# Patient Record
Sex: FEMALE | Race: WHITE | NOT HISPANIC OR LATINO | Employment: FULL TIME | URBAN - METROPOLITAN AREA
[De-identification: names, ages, dates, MRNs, and addresses within clinical notes are randomized per-mention and may not be internally consistent; named-entity substitution may affect disease eponyms.]

---

## 2017-01-05 ENCOUNTER — OFFICE VISIT (OUTPATIENT)
Dept: FAMILY MEDICINE CLINIC | Facility: CLINIC | Age: 42
End: 2017-01-05

## 2017-01-05 VITALS
TEMPERATURE: 97.8 F | HEIGHT: 69 IN | DIASTOLIC BLOOD PRESSURE: 64 MMHG | SYSTOLIC BLOOD PRESSURE: 108 MMHG | WEIGHT: 134 LBS | OXYGEN SATURATION: 98 % | HEART RATE: 73 BPM | BODY MASS INDEX: 19.85 KG/M2

## 2017-01-05 DIAGNOSIS — J40 BRONCHITIS: Primary | ICD-10-CM

## 2017-01-05 DIAGNOSIS — J44.1 COPD WITH ACUTE EXACERBATION (HCC): ICD-10-CM

## 2017-01-05 PROCEDURE — 99213 OFFICE O/P EST LOW 20 MIN: CPT | Performed by: FAMILY MEDICINE

## 2017-01-05 RX ORDER — CEFUROXIME AXETIL 250 MG/1
250 TABLET ORAL 2 TIMES DAILY
Qty: 20 TABLET | Refills: 0 | Status: SHIPPED | OUTPATIENT
Start: 2017-01-05 | End: 2020-04-11

## 2017-01-05 NOTE — MR AVS SNAPSHOT
Ana Thompson   2017 2:30 PM   Office Visit    Provider:  Jeff Swan MD   Department:  [facility]IC[facility]   Dept Phone:  [phone]                Your Full Care Plan              Today's Medication Changes          These changes are accurate as of: 17  3:10 PM.  If you have any questions, ask your nurse or doctor.               Stop taking medication(s)listed here:     butalbital-acetaminophen-caffeine -40 MG per tablet   Commonly known as:  FIORICET   Stopped by:  Jeff Swan MD                      Your Updated Medication List      Notice  As of 2017  3:10 PM    You have not been prescribed any medications.            You Were Diagnosed With        Codes Comments    Bronchitis    -  Primary ICD-10-CM: J40  ICD-9-CM: 490     COPD with acute exacerbation     ICD-10-CM: J44.1  ICD-9-CM: 491.21       Instructions     None    Patient Instructions History      [employer] Signup     [facility] [employer] allows you to send messages to your doctor, view your test results, renew your prescriptions, schedule appointments, and more. To sign up, go to [employer] and click on the Sign Up Now link in the New User? box. Enter your [employer] Activation Code exactly as it appears below along with the last four digits of your Social Security Number and your Date of Birth () to complete the sign-up process. If you do not sign up before the expiration date, you must request a new code.    Fire[employer] Activation Code: [email or web address]  Expires: 2017  3:10 PM    If you have questions, you can email [email or web address] or call [phone] to talk to our [employer] staff. Remember, [employer] is NOT to be used for urgent needs. For medical emergencies, dial 911.               Other Info from Your Visit           Allergies     Codeine      Iodine      Reglan [Metoclopramide]        Reason for Visit     Cough     Fatigue     Headache         "     Vital Signs     Blood Pressure Pulse Temperature Height Weight Oxygen Saturation    108/64 73 97.8 °F (36.6 °C) 69\" (175.3 cm) 134 lb (60.8 kg) 98%    Body Mass Index Smoking Status                19.79 kg/m2 Current Every Day Smoker          Problems and Diagnoses Noted     Bronchitis    -  Primary    COPD with acute exacerbation          "

## 2017-01-05 NOTE — PROGRESS NOTES
"Subjective   Ana Thompson is a 41 y.o. female.     Cough   This is a new problem. The current episode started more than 1 month ago. The problem has been unchanged. The cough is non-productive. Associated symptoms include headaches, postnasal drip and wheezing. Pertinent negatives include no chills, fever, myalgias, nasal congestion, sore throat or shortness of breath. The symptoms are aggravated by lying down. Risk factors for lung disease include smoking/tobacco exposure (smoker). She has tried OTC cough suppressant for the symptoms. The treatment provided mild relief.        The following portions of the patient's history were reviewed and updated as appropriate: allergies, current medications, past social history and problem list.    Review of Systems   Constitutional: Negative for chills and fever.   HENT: Positive for postnasal drip. Negative for sore throat.    Respiratory: Positive for cough and wheezing. Negative for shortness of breath.    Musculoskeletal: Negative for myalgias.   Neurological: Positive for headaches.       Objective   Visit Vitals   • /64   • Pulse 73   • Temp 97.8 °F (36.6 °C)   • Ht 69\" (175.3 cm)   • Wt 134 lb (60.8 kg)   • SpO2 98%   • BMI 19.79 kg/m2     Physical Exam   Constitutional: She appears well-developed and well-nourished.   HENT:   Right Ear: External ear normal.   Left Ear: External ear normal.   Mouth/Throat: Oropharynx is clear and moist.   Cardiovascular: Normal rate, regular rhythm and normal heart sounds.    Pulmonary/Chest: Effort normal. She has wheezes.   Mild expiratory wheezin with good air exchange.   Nursing note and vitals reviewed.      Assessment/Plan   Problem List Items Addressed This Visit     None      Visit Diagnoses     Bronchitis    -  Primary    COPD with acute exacerbation                  Drink plenty fluids.    Advised to stop smoking.    Sample for Stiolto 2 puffs daily #1+0.    Rx for Cefuroxime 250 mg twice a day for 10 days.    Check a " chest x-ray. Report results by letter.    Work excuse for yesterday and today.    Follow up as needed.                    Scribed for Dr Jeff Swan by Minal Alexandra CMA.    I, Jeff Swan MD, personally performed the services described in this documentation, as scribed by Minal Alexandra in my presence, and is both accurate and complete.

## 2020-04-11 PROCEDURE — 87491 CHLMYD TRACH DNA AMP PROBE: CPT | Performed by: NURSE PRACTITIONER

## 2020-04-11 PROCEDURE — 87801 DETECT AGNT MULT DNA AMPLI: CPT | Performed by: NURSE PRACTITIONER

## 2020-04-11 PROCEDURE — 87798 DETECT AGENT NOS DNA AMP: CPT | Performed by: NURSE PRACTITIONER

## 2020-04-11 PROCEDURE — 87661 TRICHOMONAS VAGINALIS AMPLIF: CPT | Performed by: NURSE PRACTITIONER

## 2020-04-11 PROCEDURE — 87591 N.GONORRHOEAE DNA AMP PROB: CPT | Performed by: NURSE PRACTITIONER

## 2020-04-15 ENCOUNTER — TELEPHONE (OUTPATIENT)
Dept: URGENT CARE | Facility: CLINIC | Age: 45
End: 2020-04-15

## 2020-04-16 ENCOUNTER — TELEPHONE (OUTPATIENT)
Dept: URGENT CARE | Facility: CLINIC | Age: 45
End: 2020-04-16

## 2020-04-16 NOTE — TELEPHONE ENCOUNTER
Advised Pt of results from testing. Pt is starting to feel better and has been taking medication as prescribed. [facility] Also advised to increase water intake.

## 2021-04-28 ENCOUNTER — APPOINTMENT (OUTPATIENT)
Dept: CT IMAGING | Facility: HOSPITAL | Age: 46
End: 2021-04-28

## 2021-04-28 ENCOUNTER — HOSPITAL ENCOUNTER (EMERGENCY)
Facility: HOSPITAL | Age: 46
Discharge: HOME | End: 2021-04-28
Attending: EMERGENCY MEDICINE | Admitting: EMERGENCY MEDICINE

## 2021-04-28 VITALS
HEIGHT: 68 IN | SYSTOLIC BLOOD PRESSURE: 101 MMHG | WEIGHT: 112 LBS | OXYGEN SATURATION: 97 % | DIASTOLIC BLOOD PRESSURE: 47 MMHG | BODY MASS INDEX: 16.97 KG/M2 | RESPIRATION RATE: 18 BRPM | TEMPERATURE: 98 F | HEART RATE: 72 BPM

## 2021-04-28 DIAGNOSIS — R10.30 LOWER ABDOMINAL PAIN: ICD-10-CM

## 2021-04-28 DIAGNOSIS — K50.918 CROHN'S DISEASE WITH OTHER COMPLICATION, UNSPECIFIED GASTROINTESTINAL TRACT LOCATION (HCC): Primary | ICD-10-CM

## 2021-04-28 LAB
ALBUMIN SERPL-MCNC: 3.7 G/DL (ref 3.5–5.2)
ALBUMIN/GLOB SERPL: 1.5 G/DL
ALP SERPL-CCNC: 57 U/L (ref 39–117)
ALT SERPL W P-5'-P-CCNC: 10 U/L (ref 1–33)
ANION GAP SERPL CALCULATED.3IONS-SCNC: 10 MMOL/L (ref 5–15)
AST SERPL-CCNC: 14 U/L (ref 1–32)
B-HCG UR QL: NEGATIVE
BASOPHILS # BLD AUTO: 0.04 10*3/MM3 (ref 0–0.2)
BASOPHILS NFR BLD AUTO: 0.5 % (ref 0–1.5)
BILIRUB SERPL-MCNC: 0.2 MG/DL (ref 0–1.2)
BILIRUB UR QL STRIP: NEGATIVE
BUN SERPL-MCNC: 10 MG/DL (ref 6–20)
BUN/CREAT SERPL: 15.2 (ref 7–25)
CALCIUM SPEC-SCNC: 8.5 MG/DL (ref 8.6–10.5)
CHLORIDE SERPL-SCNC: 102 MMOL/L (ref 98–107)
CLARITY UR: CLEAR
CO2 SERPL-SCNC: 28 MMOL/L (ref 22–29)
COLOR UR: YELLOW
CREAT SERPL-MCNC: 0.66 MG/DL (ref 0.57–1)
DEPRECATED RDW RBC AUTO: 45 FL (ref 37–54)
EOSINOPHIL # BLD AUTO: 0.15 10*3/MM3 (ref 0–0.4)
EOSINOPHIL NFR BLD AUTO: 2 % (ref 0.3–6.2)
ERYTHROCYTE [DISTWIDTH] IN BLOOD BY AUTOMATED COUNT: 12.2 % (ref 12.3–15.4)
GFR SERPL CREATININE-BSD FRML MDRD: 97 ML/MIN/1.73
GLOBULIN UR ELPH-MCNC: 2.4 GM/DL
GLUCOSE SERPL-MCNC: 112 MG/DL (ref 65–99)
GLUCOSE UR STRIP-MCNC: NEGATIVE MG/DL
HCT VFR BLD AUTO: 40.1 % (ref 34–46.6)
HGB BLD-MCNC: 13.3 G/DL (ref 12–15.9)
HGB UR QL STRIP.AUTO: NEGATIVE
HOLD SPECIMEN: NORMAL
IMM GRANULOCYTES # BLD AUTO: 0.02 10*3/MM3 (ref 0–0.05)
IMM GRANULOCYTES NFR BLD AUTO: 0.3 % (ref 0–0.5)
INTERNAL NEGATIVE CONTROL: NEGATIVE
INTERNAL POSITIVE CONTROL: POSITIVE
KETONES UR QL STRIP: NEGATIVE
LEUKOCYTE ESTERASE UR QL STRIP.AUTO: NEGATIVE
LIPASE SERPL-CCNC: 38 U/L (ref 13–60)
LYMPHOCYTES # BLD AUTO: 2.46 10*3/MM3 (ref 0.7–3.1)
LYMPHOCYTES NFR BLD AUTO: 32.1 % (ref 19.6–45.3)
Lab: NORMAL
MCH RBC QN AUTO: 32.7 PG (ref 26.6–33)
MCHC RBC AUTO-ENTMCNC: 33.2 G/DL (ref 31.5–35.7)
MCV RBC AUTO: 98.5 FL (ref 79–97)
MONOCYTES # BLD AUTO: 0.56 10*3/MM3 (ref 0.1–0.9)
MONOCYTES NFR BLD AUTO: 7.3 % (ref 5–12)
NEUTROPHILS NFR BLD AUTO: 4.44 10*3/MM3 (ref 1.7–7)
NEUTROPHILS NFR BLD AUTO: 57.8 % (ref 42.7–76)
NITRITE UR QL STRIP: NEGATIVE
NRBC BLD AUTO-RTO: 0 /100 WBC (ref 0–0.2)
PH UR STRIP.AUTO: <=5 [PH] (ref 5–8)
PLATELET # BLD AUTO: 280 10*3/MM3 (ref 140–450)
PMV BLD AUTO: 11.7 FL (ref 6–12)
POTASSIUM SERPL-SCNC: 3.2 MMOL/L (ref 3.5–5.2)
PROT SERPL-MCNC: 6.1 G/DL (ref 6–8.5)
PROT UR QL STRIP: NEGATIVE
RBC # BLD AUTO: 4.07 10*6/MM3 (ref 3.77–5.28)
SODIUM SERPL-SCNC: 140 MMOL/L (ref 136–145)
SP GR UR STRIP: 1.02 (ref 1–1.03)
UROBILINOGEN UR QL STRIP: NORMAL
WBC # BLD AUTO: 7.67 10*3/MM3 (ref 3.4–10.8)
WHOLE BLOOD HOLD SPECIMEN: NORMAL
WHOLE BLOOD HOLD SPECIMEN: NORMAL

## 2021-04-28 PROCEDURE — 25010000002 DIPHENHYDRAMINE PER 50 MG: Performed by: EMERGENCY MEDICINE

## 2021-04-28 PROCEDURE — 85025 COMPLETE CBC W/AUTO DIFF WBC: CPT | Performed by: EMERGENCY MEDICINE

## 2021-04-28 PROCEDURE — 74177 CT ABD & PELVIS W/CONTRAST: CPT

## 2021-04-28 PROCEDURE — 81003 URINALYSIS AUTO W/O SCOPE: CPT | Performed by: EMERGENCY MEDICINE

## 2021-04-28 PROCEDURE — 99283 EMERGENCY DEPT VISIT LOW MDM: CPT

## 2021-04-28 PROCEDURE — 25010000002 IOPAMIDOL 61 % SOLUTION: Performed by: EMERGENCY MEDICINE

## 2021-04-28 PROCEDURE — 83690 ASSAY OF LIPASE: CPT | Performed by: EMERGENCY MEDICINE

## 2021-04-28 PROCEDURE — 96375 TX/PRO/DX INJ NEW DRUG ADDON: CPT

## 2021-04-28 PROCEDURE — 81025 URINE PREGNANCY TEST: CPT | Performed by: EMERGENCY MEDICINE

## 2021-04-28 PROCEDURE — 96374 THER/PROPH/DIAG INJ IV PUSH: CPT

## 2021-04-28 PROCEDURE — 80053 COMPREHEN METABOLIC PANEL: CPT | Performed by: EMERGENCY MEDICINE

## 2021-04-28 PROCEDURE — 25010000002 METHYLPREDNISOLONE PER 40 MG: Performed by: EMERGENCY MEDICINE

## 2021-04-28 RX ORDER — POTASSIUM CHLORIDE 750 MG/1
20 CAPSULE, EXTENDED RELEASE ORAL ONCE
Status: COMPLETED | OUTPATIENT
Start: 2021-04-28 | End: 2021-04-28

## 2021-04-28 RX ORDER — ONDANSETRON 4 MG/1
4 TABLET, ORALLY DISINTEGRATING ORAL 4 TIMES DAILY PRN
Qty: 15 TABLET | Refills: 0 | Status: SHIPPED | OUTPATIENT
Start: 2021-04-28 | End: 2022-08-24

## 2021-04-28 RX ORDER — PREDNISONE 20 MG/1
60 TABLET ORAL DAILY
Qty: 12 TABLET | Refills: 0 | Status: SHIPPED | OUTPATIENT
Start: 2021-04-28 | End: 2021-05-02

## 2021-04-28 RX ORDER — SODIUM CHLORIDE 9 MG/ML
10 INJECTION INTRAVENOUS AS NEEDED
Status: DISCONTINUED | OUTPATIENT
Start: 2021-04-28 | End: 2021-04-28 | Stop reason: HOSPADM

## 2021-04-28 RX ORDER — METHYLPREDNISOLONE SODIUM SUCCINATE 40 MG/ML
40 INJECTION, POWDER, LYOPHILIZED, FOR SOLUTION INTRAMUSCULAR; INTRAVENOUS EVERY 4 HOURS
Status: DISCONTINUED | OUTPATIENT
Start: 2021-04-28 | End: 2021-04-28 | Stop reason: HOSPADM

## 2021-04-28 RX ORDER — DIPHENHYDRAMINE HYDROCHLORIDE 50 MG/ML
50 INJECTION INTRAMUSCULAR; INTRAVENOUS ONCE
Status: COMPLETED | OUTPATIENT
Start: 2021-04-28 | End: 2021-04-28

## 2021-04-28 RX ORDER — TRAMADOL HYDROCHLORIDE 50 MG/1
50 TABLET ORAL EVERY 6 HOURS PRN
Qty: 15 TABLET | Refills: 0 | Status: SHIPPED | OUTPATIENT
Start: 2021-04-28 | End: 2022-08-24

## 2021-04-28 RX ADMIN — POTASSIUM CHLORIDE 20 MEQ: 750 CAPSULE, EXTENDED RELEASE ORAL at 14:40

## 2021-04-28 RX ADMIN — SODIUM CHLORIDE 1000 ML: 9 INJECTION, SOLUTION INTRAVENOUS at 13:12

## 2021-04-28 RX ADMIN — METHYLPREDNISOLONE SODIUM SUCCINATE 40 MG: 40 INJECTION, POWDER, FOR SOLUTION INTRAMUSCULAR; INTRAVENOUS at 13:13

## 2021-04-28 RX ADMIN — IOPAMIDOL 80 ML: 612 INJECTION, SOLUTION INTRAVENOUS at 14:27

## 2021-04-28 RX ADMIN — DIPHENHYDRAMINE HYDROCHLORIDE 50 MG: 50 INJECTION INTRAMUSCULAR; INTRAVENOUS at 13:13

## 2022-09-29 ENCOUNTER — HOSPITAL ENCOUNTER (EMERGENCY)
Facility: HOSPITAL | Age: 47
Discharge: HOME | End: 2022-09-29
Attending: EMERGENCY MEDICINE | Admitting: EMERGENCY MEDICINE

## 2022-09-29 ENCOUNTER — APPOINTMENT (OUTPATIENT)
Dept: CT IMAGING | Facility: HOSPITAL | Age: 47
End: 2022-09-29

## 2022-09-29 VITALS
HEART RATE: 65 BPM | OXYGEN SATURATION: 97 % | DIASTOLIC BLOOD PRESSURE: 69 MMHG | RESPIRATION RATE: 16 BRPM | BODY MASS INDEX: 19.7 KG/M2 | SYSTOLIC BLOOD PRESSURE: 95 MMHG | WEIGHT: 130 LBS | HEIGHT: 68 IN | TEMPERATURE: 98 F

## 2022-09-29 DIAGNOSIS — R10.84 GENERALIZED ABDOMINAL PAIN: Primary | ICD-10-CM

## 2022-09-29 DIAGNOSIS — K50.10 CROHN'S DISEASE OF COLON WITHOUT COMPLICATION: ICD-10-CM

## 2022-09-29 LAB
ALBUMIN SERPL-MCNC: 4 G/DL (ref 3.5–5.2)
ALBUMIN/GLOB SERPL: 1.5 G/DL
ALP SERPL-CCNC: 62 U/L (ref 39–117)
ALT SERPL W P-5'-P-CCNC: 12 U/L (ref 1–33)
ANION GAP SERPL CALCULATED.3IONS-SCNC: 10 MMOL/L (ref 5–15)
AST SERPL-CCNC: 15 U/L (ref 1–32)
B-HCG UR QL: NEGATIVE
BACTERIA UR QL AUTO: ABNORMAL /HPF
BASOPHILS # BLD AUTO: 0.04 10*3/MM3 (ref 0–0.2)
BASOPHILS NFR BLD AUTO: 0.6 % (ref 0–1.5)
BILIRUB SERPL-MCNC: 0.5 MG/DL (ref 0–1.2)
BILIRUB UR QL STRIP: NEGATIVE
BUN SERPL-MCNC: 14 MG/DL (ref 6–20)
BUN/CREAT SERPL: 21.9 (ref 7–25)
CALCIUM SPEC-SCNC: 8.6 MG/DL (ref 8.6–10.5)
CHLORIDE SERPL-SCNC: 104 MMOL/L (ref 98–107)
CLARITY UR: CLEAR
CO2 SERPL-SCNC: 28 MMOL/L (ref 22–29)
COLOR UR: YELLOW
CREAT SERPL-MCNC: 0.64 MG/DL (ref 0.57–1)
D-LACTATE SERPL-SCNC: 1.1 MMOL/L (ref 0.5–2)
DEPRECATED RDW RBC AUTO: 40.8 FL (ref 37–54)
EGFRCR SERPLBLD CKD-EPI 2021: 109.8 ML/MIN/1.73
EOSINOPHIL # BLD AUTO: 0.14 10*3/MM3 (ref 0–0.4)
EOSINOPHIL NFR BLD AUTO: 2 % (ref 0.3–6.2)
ERYTHROCYTE [DISTWIDTH] IN BLOOD BY AUTOMATED COUNT: 11.9 % (ref 12.3–15.4)
GLOBULIN UR ELPH-MCNC: 2.6 GM/DL
GLUCOSE SERPL-MCNC: 94 MG/DL (ref 65–99)
GLUCOSE UR STRIP-MCNC: NEGATIVE MG/DL
HCT VFR BLD AUTO: 37.3 % (ref 34–46.6)
HGB BLD-MCNC: 12.5 G/DL (ref 12–15.9)
HGB UR QL STRIP.AUTO: NEGATIVE
HOLD SPECIMEN: NORMAL
HYALINE CASTS UR QL AUTO: ABNORMAL /LPF
IMM GRANULOCYTES # BLD AUTO: 0.01 10*3/MM3 (ref 0–0.05)
IMM GRANULOCYTES NFR BLD AUTO: 0.1 % (ref 0–0.5)
KETONES UR QL STRIP: ABNORMAL
LEUKOCYTE ESTERASE UR QL STRIP.AUTO: ABNORMAL
LYMPHOCYTES # BLD AUTO: 1.72 10*3/MM3 (ref 0.7–3.1)
LYMPHOCYTES NFR BLD AUTO: 24.4 % (ref 19.6–45.3)
MCH RBC QN AUTO: 32 PG (ref 26.6–33)
MCHC RBC AUTO-ENTMCNC: 33.5 G/DL (ref 31.5–35.7)
MCV RBC AUTO: 95.4 FL (ref 79–97)
MONOCYTES # BLD AUTO: 0.61 10*3/MM3 (ref 0.1–0.9)
MONOCYTES NFR BLD AUTO: 8.7 % (ref 5–12)
NEUTROPHILS NFR BLD AUTO: 4.52 10*3/MM3 (ref 1.7–7)
NEUTROPHILS NFR BLD AUTO: 64.2 % (ref 42.7–76)
NITRITE UR QL STRIP: NEGATIVE
NRBC BLD AUTO-RTO: 0 /100 WBC (ref 0–0.2)
PH UR STRIP.AUTO: 6 [PH] (ref 5–8)
PLATELET # BLD AUTO: 347 10*3/MM3 (ref 140–450)
PMV BLD AUTO: 10.8 FL (ref 6–12)
POTASSIUM SERPL-SCNC: 3.4 MMOL/L (ref 3.5–5.2)
PROT SERPL-MCNC: 6.6 G/DL (ref 6–8.5)
PROT UR QL STRIP: NEGATIVE
RBC # BLD AUTO: 3.91 10*6/MM3 (ref 3.77–5.28)
RBC # UR STRIP: ABNORMAL /HPF
REF LAB TEST METHOD: ABNORMAL
SODIUM SERPL-SCNC: 142 MMOL/L (ref 136–145)
SP GR UR STRIP: >=1.03 (ref 1–1.03)
SQUAMOUS #/AREA URNS HPF: ABNORMAL /HPF
UROBILINOGEN UR QL STRIP: ABNORMAL
WBC # UR STRIP: ABNORMAL /HPF
WBC NRBC COR # BLD: 7.04 10*3/MM3 (ref 3.4–10.8)
WHOLE BLOOD HOLD COAG: NORMAL
WHOLE BLOOD HOLD SPECIMEN: NORMAL

## 2022-09-29 PROCEDURE — 96374 THER/PROPH/DIAG INJ IV PUSH: CPT

## 2022-09-29 PROCEDURE — 83605 ASSAY OF LACTIC ACID: CPT | Performed by: PHYSICIAN ASSISTANT

## 2022-09-29 PROCEDURE — 85025 COMPLETE CBC W/AUTO DIFF WBC: CPT | Performed by: EMERGENCY MEDICINE

## 2022-09-29 PROCEDURE — 25010000002 ONDANSETRON PER 1 MG: Performed by: PHYSICIAN ASSISTANT

## 2022-09-29 PROCEDURE — 96375 TX/PRO/DX INJ NEW DRUG ADDON: CPT

## 2022-09-29 PROCEDURE — 81001 URINALYSIS AUTO W/SCOPE: CPT | Performed by: PHYSICIAN ASSISTANT

## 2022-09-29 PROCEDURE — 99283 EMERGENCY DEPT VISIT LOW MDM: CPT

## 2022-09-29 PROCEDURE — 74176 CT ABD & PELVIS W/O CONTRAST: CPT

## 2022-09-29 PROCEDURE — 81025 URINE PREGNANCY TEST: CPT | Performed by: EMERGENCY MEDICINE

## 2022-09-29 PROCEDURE — 25010000002 DEXAMETHASONE SODIUM PHOSPHATE 100 MG/10ML SOLUTION: Performed by: PHYSICIAN ASSISTANT

## 2022-09-29 PROCEDURE — 80053 COMPREHEN METABOLIC PANEL: CPT | Performed by: EMERGENCY MEDICINE

## 2022-09-29 RX ORDER — ONDANSETRON 2 MG/ML
4 INJECTION INTRAMUSCULAR; INTRAVENOUS ONCE
Status: COMPLETED | OUTPATIENT
Start: 2022-09-29 | End: 2022-09-29

## 2022-09-29 RX ORDER — SODIUM CHLORIDE 0.9 % (FLUSH) 0.9 %
10 SYRINGE (ML) INJECTION AS NEEDED
Status: DISCONTINUED | OUTPATIENT
Start: 2022-09-29 | End: 2022-09-29 | Stop reason: HOSPADM

## 2022-09-29 RX ORDER — PREDNISONE 10 MG/1
TABLET ORAL
Qty: 21 TABLET | Refills: 0 | Status: SHIPPED | OUTPATIENT
Start: 2022-09-29 | End: 2022-10-26

## 2022-09-29 RX ADMIN — DEXAMETHASONE SODIUM PHOSPHATE 10 MG: 10 INJECTION, SOLUTION INTRAMUSCULAR; INTRAVENOUS at 11:23

## 2022-09-29 RX ADMIN — ONDANSETRON 4 MG: 2 INJECTION INTRAMUSCULAR; INTRAVENOUS at 09:35

## 2022-09-29 RX ADMIN — SODIUM CHLORIDE 1000 ML: 9 INJECTION, SOLUTION INTRAVENOUS at 09:35

## 2022-10-26 PROCEDURE — U0004 COV-19 TEST NON-CDC HGH THRU: HCPCS | Performed by: FAMILY MEDICINE

## 2023-08-22 ENCOUNTER — HOSPITAL ENCOUNTER (EMERGENCY)
Facility: HOSPITAL | Age: 48
Discharge: HOME | End: 2023-08-22
Attending: EMERGENCY MEDICINE

## 2023-08-22 VITALS
HEART RATE: 73 BPM | BODY MASS INDEX: 20.46 KG/M2 | SYSTOLIC BLOOD PRESSURE: 102 MMHG | OXYGEN SATURATION: 98 % | RESPIRATION RATE: 16 BRPM | DIASTOLIC BLOOD PRESSURE: 50 MMHG | HEIGHT: 68 IN | TEMPERATURE: 98.2 F | WEIGHT: 135 LBS

## 2023-08-22 DIAGNOSIS — J02.9 VIRAL PHARYNGITIS: Primary | ICD-10-CM

## 2023-08-22 LAB — S PYO AG THROAT QL: NEGATIVE

## 2023-08-22 PROCEDURE — 87880 STREP A ASSAY W/OPTIC: CPT | Performed by: PHYSICIAN ASSISTANT

## 2023-08-22 PROCEDURE — 99282 EMERGENCY DEPT VISIT SF MDM: CPT

## 2023-08-22 PROCEDURE — 87081 CULTURE SCREEN ONLY: CPT | Performed by: PHYSICIAN ASSISTANT

## 2023-08-22 NOTE — Clinical Note
[facility] EMERGENCY DEPARTMENT  [address]  [facility] [address]  Phone: [phone]    Ana Thompson was seen and treated in our emergency department on 8/22/2023.  She may return to work on 08/23/2023.         Thank you for choosing [facility].    Marco Antonio Cornejo PA

## 2023-08-22 NOTE — ED PROVIDER NOTES
Subjective   History of Present Illness  48-year-old female presents emergency department today with a sore throat.  She reports strep's been going around the office they wanted to get a strep screen.  She feels like she is not got strep currently she had multiple times in the past this does not feel similar.  She has had no fevers no chills.  She had no nausea no vomiting.  She denies abdominal pain associated with this.  She has a quite a bit of sinus drainage.  She does not smoke but she does vape.    History provided by:  Patient   used: No    Sore Throat  Location:  Posterior  Quality:  Sore  Severity:  Mild  Onset quality:  Gradual  Duration:  2 days  Timing:  Constant  Progression:  Unchanged  Relieved by:  Nothing  Worsened by:  Eating and drinking  Ineffective treatments:  None tried  Associated symptoms: postnasal drip    Associated symptoms: no abdominal pain, no chest pain, no chills, no cough, no ear pain, no epistaxis, no fever, no headaches, no neck stiffness, no night sweats, no plugged ear sensation, no rhinorrhea, no sinus congestion, no trouble swallowing and no voice change    Risk factors: sick contacts    Risk factors: no exposure to strep, no exposure to mono, no recent dental procedure, no recent endoscopy and no recent ENT procedure      Review of Systems   Constitutional:  Negative for chills, fever and night sweats.   HENT:  Positive for postnasal drip and sore throat. Negative for ear pain, nosebleeds, rhinorrhea, trouble swallowing and voice change.    Respiratory:  Negative for cough, chest tightness and wheezing.    Cardiovascular:  Negative for chest pain and palpitations.   Gastrointestinal:  Negative for abdominal pain.   Musculoskeletal:  Negative for neck stiffness.   Neurological:  Negative for headaches.   Psychiatric/Behavioral: Negative.     All other systems reviewed and are negative.    Past Medical History:   Diagnosis Date    Crohn's disease   "      Allergies   Allergen Reactions    Hydrocodone-Acetaminophen Itching    Codeine Other (See Comments)     UNKNOWN  Other reaction(s): Other (See Comments)  UNKNOWN    Iodine Other (See Comments)     BURNS    Metoclopramide Anxiety     Other reaction(s): \"it makes me looney\"       Past Surgical History:   Procedure Laterality Date    COLON SURGERY      TUBAL ABDOMINAL LIGATION         History reviewed. No pertinent family history.    Social History     Socioeconomic History    Marital status: Single   Tobacco Use    Smoking status: Former     Packs/day: 0.50     Types: Cigarettes    Smokeless tobacco: Never   Vaping Use    Vaping Use: Every day    Substances: Nicotine, Flavoring    Devices: Refillable tank   Substance and Sexual Activity    Alcohol use: No    Drug use: No    Sexual activity: Defer           Objective   Physical Exam  Vitals and nursing note reviewed.   Constitutional:       General: She is not in acute distress.     Appearance: She is well-developed. She is not diaphoretic.   HENT:      Head: Normocephalic and atraumatic.      Right Ear: Tympanic membrane normal. No drainage.      Left Ear: Tympanic membrane normal. No drainage.      Nose: Nose normal. Congestion and rhinorrhea present.      Mouth/Throat:      Mouth: Mucous membranes are moist.   Eyes:      General: No scleral icterus.     Conjunctiva/sclera: Conjunctivae normal.   Cardiovascular:      Rate and Rhythm: Normal rate and regular rhythm.      Heart sounds: Normal heart sounds. No murmur heard.  Pulmonary:      Effort: Pulmonary effort is normal. No respiratory distress.      Breath sounds: Normal breath sounds.   Abdominal:      General: Bowel sounds are normal.      Palpations: Abdomen is soft.      Tenderness: There is no abdominal tenderness.   Musculoskeletal:         General: Normal range of motion.      Cervical back: Normal range of motion and neck supple.   Skin:     General: Skin is warm and dry.   Neurological:      Mental " "Status: She is alert and oriented to person, place, and time.   Psychiatric:         Behavior: Behavior normal.       Procedures           ED Course                No results found for this or any previous visit (from the past 24 hour(s)).  Note: In addition to lab results from this visit, the labs listed above may include labs taken at another facility or during a different encounter within the last 24 hours. Please correlate lab times with ED admission and discharge times for further clarification of the services performed during this visit.    No orders to display     Vitals:    08/22/23 1102   BP: 102/50   BP Location: Left arm   Patient Position: Sitting   Pulse: 73   Resp: 16   Temp: 98.2 øF (36.8 øC)   TempSrc: Oral   SpO2: 98%   Weight: 61.2 kg (135 lb)   Height: 172.7 cm (68\")     Medications - No data to display  ECG/EMG Results (last 24 hours)       ** No results found for the last 24 hours. **          No orders to display                                  Medical Decision Making  Sore throat and has been around people with sore throat and positive strep.  However she states she has had strep multiple times this feels same.  Most the pains in the right side of the throat and it feels like mostly due to posterior nasal drainage.  Rapid strep screen is negative.  She did not want antibiotics we will follow-up and wait on the throat culture.    Problems Addressed:  Viral pharyngitis: acute illness or injury        Final diagnoses:   Viral pharyngitis       ED Disposition  ED Disposition       ED Disposition   Discharge    Condition   Stable    Comment   --               Liliya Hill MD  [address] [place] [address]  [phone]               Medication List      No changes were made to your prescriptions during this visit.            Marco Antonio Cornejo PA  08/23/23 1453    "

## 2023-08-23 ENCOUNTER — PATIENT OUTREACH (OUTPATIENT)
Dept: CASE MANAGEMENT | Facility: OTHER | Age: 48
End: 2023-08-23

## 2023-08-23 NOTE — OUTREACH NOTE
AMBULATORY CASE MANAGEMENT NOTE    Name and Relationship of Patient/Support Person: Ana Thompson Kath - Self    Patient Outreach    Patient discharged from [facility] ED 8/22/23, where she was seen for viral pharyngitis. RN-ACM outreach call made to patient. Explained role of RN-ACM and reason for call. Patient states she is doing well at home today. Reviewed ED AVS with patient, education provided. Patient requesting resources to assist with financial, medical bills. RN-ACM entered social work eval request. Patient has no further questions at this time. RN-ACM advised patient to call RN-ACM or [facility] Nurse Line with any needs.       Follow up outreach PRN     Adult Patient Profile  Questions/Answers      Flowsheet Row Most Recent Value   Symptoms/Conditions Managed at Home HEENT (head, eyes, ears, nose, throat)   Barriers to Managing Health financial resources   HEENT Symptoms/Conditions pain   Pain Location throat   HEENT Management Strategies adequate rest   Hearing Difficulty or Deaf no   Difficulty Communicating no   Primary Source of Support/Comfort parent   Current Living Arrangements home   Resource/Environmental Concerns none          Send Education  Questions/Answers      Flowsheet Row Most Recent Value   Annual Wellness Visit:  Patient Will Schedule   Other Patient Education/Resources  24/7 [facility] Nurse Call Line   24/7 Nurse Call Line Education Method Verbal   Advanced Directives: Patient Has            Cherelle BONNER  Ambulatory Case Management    8/23/2023, 16:21 EDT

## 2023-08-24 LAB — BACTERIA SPEC AEROBE CULT: NORMAL

## 2023-08-30 ENCOUNTER — PATIENT OUTREACH (OUTPATIENT)
Dept: CASE MANAGEMENT | Facility: OTHER | Age: 48
End: 2023-08-30

## 2023-08-30 NOTE — OUTREACH NOTE
Patient Outreach    SW attempted to call pt. Pt answered and said that she was at work and could not talk at work. SW sent pt a [employer] message with details about how to apply for medicaid and marketplace options. SW also provided contact for the local [facility] and [facility] financial assistance.    Pita MCGRATH -   Ambulatory Case Management    8/30/2023, 11:29 EDT

## 2023-09-25 ENCOUNTER — TELEPHONE (OUTPATIENT)
Dept: CASE MANAGEMENT | Facility: OTHER | Age: 48
End: 2023-09-25

## 2023-09-25 NOTE — TELEPHONE ENCOUNTER
30 day record review completed. No changes in patients POC since last RN-ACM outreach. RN-ACM will close HRCM program at this time.

## 2023-11-20 ENCOUNTER — HOSPITAL ENCOUNTER (OUTPATIENT)
Facility: HOSPITAL | Age: 48
Setting detail: OBSERVATION
Discharge: HOME | End: 2023-11-22
Attending: EMERGENCY MEDICINE | Admitting: STUDENT IN AN ORGANIZED HEALTH CARE EDUCATION/TRAINING PROGRAM

## 2023-11-20 ENCOUNTER — APPOINTMENT (OUTPATIENT)
Dept: CT IMAGING | Facility: HOSPITAL | Age: 48
End: 2023-11-20

## 2023-11-20 DIAGNOSIS — E87.6 HYPOKALEMIA: ICD-10-CM

## 2023-11-20 DIAGNOSIS — K50.111 CROHN'S DISEASE OF COLON WITH RECTAL BLEEDING: Primary | ICD-10-CM

## 2023-11-20 PROBLEM — K50.10 CROHN'S COLITIS: Status: ACTIVE | Noted: 2023-11-20

## 2023-11-20 PROBLEM — Z72.0 VAPES NICOTINE CONTAINING SUBSTANCE: Status: ACTIVE | Noted: 2023-11-20

## 2023-11-20 PROBLEM — E83.42 HYPOMAGNESEMIA: Status: ACTIVE | Noted: 2023-11-20

## 2023-11-20 LAB
ALBUMIN SERPL-MCNC: 4.2 G/DL (ref 3.5–5.2)
ALBUMIN/GLOB SERPL: 1.5 G/DL
ALP SERPL-CCNC: 63 U/L (ref 39–117)
ALT SERPL W P-5'-P-CCNC: 11 U/L (ref 1–33)
ANION GAP SERPL CALCULATED.3IONS-SCNC: 8 MMOL/L (ref 5–15)
AST SERPL-CCNC: 13 U/L (ref 1–32)
B-HCG UR QL: NEGATIVE
BACTERIA UR QL AUTO: ABNORMAL /HPF
BASOPHILS # BLD AUTO: 0.04 10*3/MM3 (ref 0–0.2)
BASOPHILS NFR BLD AUTO: 0.5 % (ref 0–1.5)
BILIRUB SERPL-MCNC: 0.4 MG/DL (ref 0–1.2)
BILIRUB UR QL STRIP: NEGATIVE
BUN SERPL-MCNC: 6 MG/DL (ref 6–20)
BUN/CREAT SERPL: 7.5 (ref 7–25)
CALCIUM SPEC-SCNC: 8.8 MG/DL (ref 8.6–10.5)
CHLORIDE SERPL-SCNC: 100 MMOL/L (ref 98–107)
CLARITY UR: ABNORMAL
CO2 SERPL-SCNC: 33 MMOL/L (ref 22–29)
COLOR UR: YELLOW
CREAT SERPL-MCNC: 0.8 MG/DL (ref 0.57–1)
DEPRECATED RDW RBC AUTO: 40.7 FL (ref 37–54)
EGFRCR SERPLBLD CKD-EPI 2021: 91 ML/MIN/1.73
EOSINOPHIL # BLD AUTO: 0.11 10*3/MM3 (ref 0–0.4)
EOSINOPHIL NFR BLD AUTO: 1.3 % (ref 0.3–6.2)
ERYTHROCYTE [DISTWIDTH] IN BLOOD BY AUTOMATED COUNT: 11.8 % (ref 12.3–15.4)
EXPIRATION DATE: NORMAL
FECAL OCCULT BLOOD SCREEN, POC: NEGATIVE
GLOBULIN UR ELPH-MCNC: 2.8 GM/DL
GLUCOSE SERPL-MCNC: 86 MG/DL (ref 65–99)
GLUCOSE UR STRIP-MCNC: NEGATIVE MG/DL
HCT VFR BLD AUTO: 42.1 % (ref 34–46.6)
HGB BLD-MCNC: 14.4 G/DL (ref 12–15.9)
HGB UR QL STRIP.AUTO: NEGATIVE
HOLD SPECIMEN: NORMAL
HYALINE CASTS UR QL AUTO: ABNORMAL /LPF
IMM GRANULOCYTES # BLD AUTO: 0.03 10*3/MM3 (ref 0–0.05)
IMM GRANULOCYTES NFR BLD AUTO: 0.3 % (ref 0–0.5)
INTERNAL NEGATIVE CONTROL: NEGATIVE
INTERNAL POSITIVE CONTROL: POSITIVE
KETONES UR QL STRIP: NEGATIVE
LEUKOCYTE ESTERASE UR QL STRIP.AUTO: ABNORMAL
LIPASE SERPL-CCNC: 101 U/L (ref 13–60)
LYMPHOCYTES # BLD AUTO: 2.51 10*3/MM3 (ref 0.7–3.1)
LYMPHOCYTES NFR BLD AUTO: 28.7 % (ref 19.6–45.3)
Lab: NORMAL
MAGNESIUM SERPL-MCNC: 1.2 MG/DL (ref 1.6–2.6)
MCH RBC QN AUTO: 32.2 PG (ref 26.6–33)
MCHC RBC AUTO-ENTMCNC: 34.2 G/DL (ref 31.5–35.7)
MCV RBC AUTO: 94.2 FL (ref 79–97)
MONOCYTES # BLD AUTO: 0.71 10*3/MM3 (ref 0.1–0.9)
MONOCYTES NFR BLD AUTO: 8.1 % (ref 5–12)
NEGATIVE CONTROL: NORMAL
NEUTROPHILS NFR BLD AUTO: 5.36 10*3/MM3 (ref 1.7–7)
NEUTROPHILS NFR BLD AUTO: 61.1 % (ref 42.7–76)
NITRITE UR QL STRIP: NEGATIVE
NRBC BLD AUTO-RTO: 0 /100 WBC (ref 0–0.2)
PH UR STRIP.AUTO: 6 [PH] (ref 5–8)
PLATELET # BLD AUTO: 384 10*3/MM3 (ref 140–450)
PMV BLD AUTO: 11.1 FL (ref 6–12)
POSITIVE CONTROL: NORMAL
POTASSIUM SERPL-SCNC: 2.6 MMOL/L (ref 3.5–5.2)
PROT SERPL-MCNC: 7 G/DL (ref 6–8.5)
PROT UR QL STRIP: NEGATIVE
RBC # BLD AUTO: 4.47 10*6/MM3 (ref 3.77–5.28)
RBC # UR STRIP: ABNORMAL /HPF
REF LAB TEST METHOD: ABNORMAL
SODIUM SERPL-SCNC: 141 MMOL/L (ref 136–145)
SP GR UR STRIP: 1.02 (ref 1–1.03)
SQUAMOUS #/AREA URNS HPF: ABNORMAL /HPF
UROBILINOGEN UR QL STRIP: ABNORMAL
WBC # UR STRIP: ABNORMAL /HPF
WBC NRBC COR # BLD AUTO: 8.76 10*3/MM3 (ref 3.4–10.8)
WHOLE BLOOD HOLD COAG: NORMAL
WHOLE BLOOD HOLD SPECIMEN: NORMAL

## 2023-11-20 PROCEDURE — 96375 TX/PRO/DX INJ NEW DRUG ADDON: CPT

## 2023-11-20 PROCEDURE — 25010000002 METHYLPREDNISOLONE PER 40 MG: Performed by: PHYSICIAN ASSISTANT

## 2023-11-20 PROCEDURE — 93005 ELECTROCARDIOGRAM TRACING: CPT | Performed by: PHYSICIAN ASSISTANT

## 2023-11-20 PROCEDURE — 25010000002 POTASSIUM CHLORIDE 10 MEQ/100ML SOLUTION: Performed by: PHYSICIAN ASSISTANT

## 2023-11-20 PROCEDURE — 81025 URINE PREGNANCY TEST: CPT | Performed by: EMERGENCY MEDICINE

## 2023-11-20 PROCEDURE — 25010000002 ONDANSETRON PER 1 MG: Performed by: PHYSICIAN ASSISTANT

## 2023-11-20 PROCEDURE — 82270 OCCULT BLOOD FECES: CPT | Performed by: PHYSICIAN ASSISTANT

## 2023-11-20 PROCEDURE — 99285 EMERGENCY DEPT VISIT HI MDM: CPT

## 2023-11-20 PROCEDURE — G0378 HOSPITAL OBSERVATION PER HR: HCPCS

## 2023-11-20 PROCEDURE — 81001 URINALYSIS AUTO W/SCOPE: CPT | Performed by: EMERGENCY MEDICINE

## 2023-11-20 PROCEDURE — 96376 TX/PRO/DX INJ SAME DRUG ADON: CPT

## 2023-11-20 PROCEDURE — 74177 CT ABD & PELVIS W/CONTRAST: CPT

## 2023-11-20 PROCEDURE — 96365 THER/PROPH/DIAG IV INF INIT: CPT

## 2023-11-20 PROCEDURE — 80053 COMPREHEN METABOLIC PANEL: CPT | Performed by: EMERGENCY MEDICINE

## 2023-11-20 PROCEDURE — 25510000001 IOPAMIDOL 61 % SOLUTION: Performed by: EMERGENCY MEDICINE

## 2023-11-20 PROCEDURE — 83690 ASSAY OF LIPASE: CPT | Performed by: EMERGENCY MEDICINE

## 2023-11-20 PROCEDURE — 83735 ASSAY OF MAGNESIUM: CPT | Performed by: PHYSICIAN ASSISTANT

## 2023-11-20 PROCEDURE — 85025 COMPLETE CBC W/AUTO DIFF WBC: CPT | Performed by: EMERGENCY MEDICINE

## 2023-11-20 PROCEDURE — 25010000002 DIPHENHYDRAMINE PER 50 MG: Performed by: PHYSICIAN ASSISTANT

## 2023-11-20 PROCEDURE — 96361 HYDRATE IV INFUSION ADD-ON: CPT

## 2023-11-20 PROCEDURE — 99222 1ST HOSP IP/OBS MODERATE 55: CPT | Performed by: NURSE PRACTITIONER

## 2023-11-20 PROCEDURE — 25810000003 SODIUM CHLORIDE 0.9 % SOLUTION: Performed by: PHYSICIAN ASSISTANT

## 2023-11-20 RX ORDER — DIPHENHYDRAMINE HYDROCHLORIDE 50 MG/ML
50 INJECTION INTRAMUSCULAR; INTRAVENOUS
Status: COMPLETED | OUTPATIENT
Start: 2023-11-20 | End: 2023-11-20

## 2023-11-20 RX ORDER — POTASSIUM CHLORIDE 7.45 MG/ML
10 INJECTION INTRAVENOUS ONCE
Status: COMPLETED | OUTPATIENT
Start: 2023-11-20 | End: 2023-11-20

## 2023-11-20 RX ORDER — POTASSIUM CHLORIDE 750 MG/1
40 CAPSULE, EXTENDED RELEASE ORAL ONCE
Status: COMPLETED | OUTPATIENT
Start: 2023-11-20 | End: 2023-11-20

## 2023-11-20 RX ORDER — METHYLPREDNISOLONE SODIUM SUCCINATE 40 MG/ML
40 INJECTION, POWDER, LYOPHILIZED, FOR SOLUTION INTRAMUSCULAR; INTRAVENOUS EVERY 4 HOURS
Status: DISPENSED | OUTPATIENT
Start: 2023-11-20 | End: 2023-11-21

## 2023-11-20 RX ORDER — SODIUM CHLORIDE 9 MG/ML
10 INJECTION INTRAVENOUS AS NEEDED
Status: DISCONTINUED | OUTPATIENT
Start: 2023-11-20 | End: 2023-11-22 | Stop reason: HOSPADM

## 2023-11-20 RX ORDER — ONDANSETRON 2 MG/ML
4 INJECTION INTRAMUSCULAR; INTRAVENOUS ONCE
Status: COMPLETED | OUTPATIENT
Start: 2023-11-20 | End: 2023-11-20

## 2023-11-20 RX ADMIN — POTASSIUM CHLORIDE 40 MEQ: 750 CAPSULE, EXTENDED RELEASE ORAL at 17:36

## 2023-11-20 RX ADMIN — MAGNESIUM OXIDE TAB 400 MG (241.3 MG ELEMENTAL MG) 400 MG: 400 (241.3 MG) TAB at 17:36

## 2023-11-20 RX ADMIN — ONDANSETRON 4 MG: 2 INJECTION INTRAMUSCULAR; INTRAVENOUS at 16:43

## 2023-11-20 RX ADMIN — SODIUM CHLORIDE 1000 ML: 9 INJECTION, SOLUTION INTRAVENOUS at 17:13

## 2023-11-20 RX ADMIN — METHYLPREDNISOLONE SODIUM SUCCINATE 40 MG: 40 INJECTION, POWDER, FOR SOLUTION INTRAMUSCULAR; INTRAVENOUS at 23:54

## 2023-11-20 RX ADMIN — POTASSIUM CHLORIDE 10 MEQ: 7.46 INJECTION, SOLUTION INTRAVENOUS at 18:58

## 2023-11-20 RX ADMIN — METHYLPREDNISOLONE SODIUM SUCCINATE 40 MG: 40 INJECTION, POWDER, FOR SOLUTION INTRAMUSCULAR; INTRAVENOUS at 16:44

## 2023-11-20 RX ADMIN — DIPHENHYDRAMINE HYDROCHLORIDE 50 MG: 50 INJECTION INTRAMUSCULAR; INTRAVENOUS at 16:44

## 2023-11-20 RX ADMIN — IOPAMIDOL 85 ML: 612 INJECTION, SOLUTION INTRAVENOUS at 17:05

## 2023-11-20 NOTE — Clinical Note
[address]  [address]  formerly [facility] [address]  Phone: [phone]  Fax: [phone]    Ana Thompson was seen and treated in our emergency department on 11/20/2023.  She may return to work on 11/23/2023.         Thank you for choosing [facility].    Dianna Alvarez RN

## 2023-11-20 NOTE — ED PROVIDER NOTES
"Subjective  History of Present Illness:    Chief Complaint: Bright red blood with bowel movements  History of Present Illness: 48-year-old female presents with bright red blood with bowel movements and wiping, history of Crohn's disease, she has had pain intermittently for several weeks but worse over the last few days.  She states the pain is all over her abdomen, and it hurts with bowel movements, she also reports she has had a decreased appetite.  She states she has had previous surgeries in the past because of her Crohn's disease  Onset: Gradual onset  Duration: 1 week  Exacerbating / Alleviating factors: Worse with bowel movements  Associated symptoms: Blood with bowel movements      Nurses Notes reviewed and agree, including vitals, allergies, social history and prior medical history.     REVIEW OF SYSTEMS: All systems reviewed and not pertinent unless noted.    Review of Systems   Gastrointestinal:  Positive for abdominal pain and blood in stool.   All other systems reviewed and are negative.      Past Medical History:   Diagnosis Date    Crohn's disease        Allergies:    Hydrocodone-acetaminophen, Codeine, Iodine, and Metoclopramide      Past Surgical History:   Procedure Laterality Date    COLON SURGERY      TUBAL ABDOMINAL LIGATION           Social History     Socioeconomic History    Marital status: Single   Tobacco Use    Smoking status: Former     Packs/day: .5     Types: Cigarettes    Smokeless tobacco: Never   Vaping Use    Vaping Use: Every day    Substances: Nicotine, Flavoring    Devices: Refillable tank   Substance and Sexual Activity    Alcohol use: No    Drug use: No    Sexual activity: Defer         Family History   Problem Relation Age of Onset    Kidney disease Mother        Objective  Physical Exam:  /75   Pulse 66   Temp 98.1 °F (36.7 °C) (Oral)   Resp 16   Ht 172.7 cm (68\")   Wt 59 kg (130 lb)   SpO2 98%   BMI 19.77 kg/m²      Physical Exam  Vitals and nursing note " reviewed.   Constitutional:       Appearance: She is well-developed.   HENT:      Head: Normocephalic and atraumatic.   Cardiovascular:      Rate and Rhythm: Normal rate and regular rhythm.   Pulmonary:      Effort: Pulmonary effort is normal.      Breath sounds: Normal breath sounds.   Abdominal:      Palpations: Abdomen is soft.   Musculoskeletal:         General: Normal range of motion.      Cervical back: Normal range of motion and neck supple.   Skin:     General: Skin is warm and dry.   Neurological:      Mental Status: She is alert and oriented to person, place, and time.      Deep Tendon Reflexes: Reflexes are normal and symmetric.           Procedures    ED Course:    ED Course as of 11/20/23 2220   Mon Nov 20, 2023   1644 I updated the patient  on all pending labs and lab results, and all pending radiology and  results and answered all questions.   [CS]   1644 Potassium(!!): 2.6 [CS]   1644 Magnesium(!): 1.2  Orally replaced [CS]   1903 Reach out to the hospitalist Dr. Viveros for admission [CS]      ED Course User Index  [CS] Dario Giron Jr., PIERCE       Lab Results (last 24 hours)       Procedure Component Value Units Date/Time    CBC & Differential [[id number]]  (Abnormal) Collected: 11/20/23 1502    Specimen: Blood Updated: 11/20/23 1524    Narrative:      The following orders were created for panel order CBC & Differential.  Procedure                               Abnormality         Status                     ---------                               -----------         ------                     CBC Auto Differential[[id number]]        Abnormal            Final result                 Please view results for these tests on the individual orders.    Comprehensive Metabolic Panel [[id number]]  (Abnormal) Collected: 11/20/23 1502    Specimen: Blood Updated: 11/20/23 1602     Glucose 86 mg/dL      BUN 6 mg/dL      Creatinine 0.80 mg/dL      Sodium 141 mmol/L      Potassium 2.6 mmol/L      Comment: Slight  hemolysis detected by analyzer. Result may be falsely elevated.        Chloride 100 mmol/L      CO2 33.0 mmol/L      Calcium 8.8 mg/dL      Total Protein 7.0 g/dL      Albumin 4.2 g/dL      ALT (SGPT) 11 U/L      AST (SGOT) 13 U/L      Alkaline Phosphatase 63 U/L      Total Bilirubin 0.4 mg/dL      Globulin 2.8 gm/dL      Comment: Calculated Result        A/G Ratio 1.5 g/dL      BUN/Creatinine Ratio 7.5     Anion Gap 8.0 mmol/L      eGFR 91.0 mL/min/1.73     Narrative:      GFR Normal >60  Chronic Kidney Disease <60  Kidney Failure <15      Lipase [[id number]]  (Abnormal) Collected: 11/20/23 1502    Specimen: Blood Updated: 11/20/23 1547     Lipase 101 U/L     CBC Auto Differential [[id number]]  (Abnormal) Collected: 11/20/23 1502    Specimen: Blood Updated: 11/20/23 1524     WBC 8.76 10*3/mm3      RBC 4.47 10*6/mm3      Hemoglobin 14.4 g/dL      Hematocrit 42.1 %      MCV 94.2 fL      MCH 32.2 pg      MCHC 34.2 g/dL      RDW 11.8 %      RDW-SD 40.7 fl      MPV 11.1 fL      Platelets 384 10*3/mm3      Neutrophil % 61.1 %      Lymphocyte % 28.7 %      Monocyte % 8.1 %      Eosinophil % 1.3 %      Basophil % 0.5 %      Immature Grans % 0.3 %      Neutrophils, Absolute 5.36 10*3/mm3      Lymphocytes, Absolute 2.51 10*3/mm3      Monocytes, Absolute 0.71 10*3/mm3      Eosinophils, Absolute 0.11 10*3/mm3      Basophils, Absolute 0.04 10*3/mm3      Immature Grans, Absolute 0.03 10*3/mm3      nRBC 0.0 /100 WBC     Magnesium [[id number]]  (Abnormal) Collected: 11/20/23 1502    Specimen: Blood Updated: 11/20/23 1627     Magnesium 1.2 mg/dL     Urinalysis With Microscopic If Indicated (No Culture) - Urine, Clean Catch [[id number]]  (Abnormal) Collected: 11/20/23 1507    Specimen: Urine, Clean Catch Updated: 11/20/23 1532     Color, UA Yellow     Appearance, UA Cloudy     pH, UA 6.0     Specific Gravity, UA 1.019     Glucose, UA Negative     Ketones, UA Negative     Bilirubin, UA Negative     Blood, UA Negative     Protein, UA  Negative     Leuk Esterase, UA Small (1+)     Nitrite, UA Negative     Urobilinogen, UA 0.2 E.U./dL    Urinalysis, Microscopic Only - Urine, Clean Catch [[id number]]  (Abnormal) Collected: 11/20/23 1507    Specimen: Urine, Clean Catch Updated: 11/20/23 1532     RBC, UA 0-2 /HPF      WBC, UA 21-50 /HPF      Bacteria, UA 1+ /HPF      Squamous Epithelial Cells, UA 7-12 /HPF      Hyaline Casts, UA 13-20 /LPF      Methodology Automated Microscopy    POC Urine Pregnancy [[id number]] Collected: 11/20/23 1511    Specimen: Urine Updated: 11/20/23 1512     HCG, Urine, QL Negative     Lot Number [id number]     Internal Positive Control Positive     Internal Negative Control Negative     Expiration Date 01/30/2025    POCT Occult Blood, Stool [[id number]]  (Normal) Resulted: 11/20/23 1900    Specimen: Stool from Per Rectum Updated: 11/20/23 1900     Fecal Occult Blood Negative     Positive Control --     Negative Control --             CT Abdomen Pelvis With Contrast    Result Date: 11/20/2023  CT ABDOMEN PELVIS W CONTRAST Date of Exam: 11/20/2023 4:55 PM EST Indication: h/o crohns brbpr. Comparison: None available. Technique: Axial CT images were obtained of the abdomen and pelvis following the uneventful intravenous administration of Isovue-300 85 mL IV. Reconstructed coronal and sagittal images were also obtained. Automated exposure control and iterative construction methods were used. Findings: Lung Bases: No focal consolidation or effusion. There is minimal bronchiectasis and tree-in-bud opacities in the inferior lingula. Liver: Liver is normal in size and CT density. No focal lesions. The portal vein is patent. Biliary/Gallbladder:  The gallbladder is normal without evidence of radiopaque stones. The biliary tree is nondilated. Pancreas:  Pancreas is normal. There is no evidence of pancreatic mass or peripancreatic fluid. Spleen: Spleen is normal in size and CT density. Gastrointestinal/Mesentery: The stomach and duodenum appear  within normal limits. There is no evidence of dilated small bowel loops. There is moderate wall thickening of the terminal ileum. There is mild hyperemia of the associated mesenteric. The appendix is surgically absent. There is mild dilatation of the cecum, ascending, and transverse colon. There is loss of haustral markings throughout the colon. There is mild diffuse colonic wall thickening.  No free air.No mesenteric fluid collections identified. Fat-containing umbilical hernia measures 4.8 cm in width. Adrenals: The right adrenal gland is unremarkable. Stable 3.3 cm smoothly marginated left adrenal nodule previously described as adenoma. Kidneys: Kidneys are normal in size. There are no stones or hydronephrosis. The kidneys demonstrate symmetric enhancement and contrast excretion. Bladder: The urinary bladder is normal. Reproductive organs:  The uterus and ovaries appear within normal limits. Slightly dilated periuterine veins are visualized as well as dilatation of the left gonadal vein. There is early opacification suggestive of reflux. Findings may be on the basis of pelvic congestion syndrome in the proper setting. Retroperitoneal/Lymph Nodes/Vasculature: No retroperitoneal adenopathy is identified. The aorta is normal in caliber.   Bony Structures:  No acute fracture or aggressive lesions.     Impression: Impression: Findings compatible with terminal ileitis. Mild diffuse colonic wall thickening and loss of haustral markings. This can be seen with ulcerative colitis. There is mild dilatation of the right colon and transverse colon without discrete transition point. This may be secondary to colonic ileus. Stable 3.3 cm left adrenal adenoma. Moderate-sized fat-containing umbilical hernia measuring 4.8 cm in width. Electronically Signed: Ankit Fournier MD  11/20/2023 5:21 PM EST  Workstation ID: [id number]        Medical Decision Making  Patient Presentation 48-year-old female presented with abdominal pain,  diarrhea, and blood in her stool, pain, initial assessment on palpation vital signs stable    DDX colitis, enteritis, diverticulitis, GI bleed, gastroenteritis, electrolyte abnormality, JOHAN    Data Review/ Non ED Records /Analysis/Ordering unique tests. Review of previous visits, prior labs, prior imaging, available notes from prior evaluations or visits with specialists, medication list, allergies, past medical history, past surgical history CBC, CMP, lipase, occult stool, urinalysis were performed        Independent Review Studies and interpreted all labs including CBC, CMP, lipase, urinalysis, occult stool discussed with the patient at the bedside    Intervention/Re-evaluation intervention included IV fluids, pain medicine and antiemetics on reevaluation symptoms were improved    Independent Clinician discussed with my supervising physician Dr. Guan    Risk Stratification tools/clinical decision rules patient has a history of Crohn's disease, she has ongoing diarrhea, abdominal pain, and bloody diarrhea, she was found to have hypokalemia and hypomagnesia, her CT scan showed terminal ileitis, this is consistent with a Crohn's colitis acute flareup with electrolyte abnormalities.  Patient was admitted for further care due to the risk of continued electrolyte abnormalities, bowel obstruction, bowel ischemia.    Shared Decision Making discussed the plan of admission with the patient and she agrees    Disposition patient stable for admission    Problems Addressed:  Crohn's disease of colon with rectal bleeding: complicated acute illness or injury  Hypokalemia: complicated acute illness or injury    Amount and/or Complexity of Data Reviewed  Labs: ordered. Decision-making details documented in ED Course.  Radiology: ordered.  ECG/medicine tests: ordered.    Risk  OTC drugs.  Prescription drug management.  Decision regarding hospitalization.          Final diagnoses:   Crohn's disease of colon with rectal bleeding    Hypokalemia          Dario Giron Jr., PA-C  11/20/23 8525

## 2023-11-21 LAB
ALBUMIN SERPL-MCNC: 3.5 G/DL (ref 3.5–5.2)
ALBUMIN/GLOB SERPL: 1.5 G/DL
ALP SERPL-CCNC: 53 U/L (ref 39–117)
ALT SERPL W P-5'-P-CCNC: 9 U/L (ref 1–33)
ANION GAP SERPL CALCULATED.3IONS-SCNC: 11 MMOL/L (ref 5–15)
ANION GAP SERPL CALCULATED.3IONS-SCNC: 13 MMOL/L (ref 5–15)
AST SERPL-CCNC: 13 U/L (ref 1–32)
BASOPHILS # BLD AUTO: 0.01 10*3/MM3 (ref 0–0.2)
BASOPHILS NFR BLD AUTO: 0.1 % (ref 0–1.5)
BILIRUB SERPL-MCNC: 0.3 MG/DL (ref 0–1.2)
BUN SERPL-MCNC: 6 MG/DL (ref 6–20)
BUN SERPL-MCNC: 6 MG/DL (ref 6–20)
BUN/CREAT SERPL: 10.2 (ref 7–25)
BUN/CREAT SERPL: 11.8 (ref 7–25)
CALCIUM SPEC-SCNC: 8.4 MG/DL (ref 8.6–10.5)
CALCIUM SPEC-SCNC: 8.7 MG/DL (ref 8.6–10.5)
CHLORIDE SERPL-SCNC: 103 MMOL/L (ref 98–107)
CHLORIDE SERPL-SCNC: 107 MMOL/L (ref 98–107)
CO2 SERPL-SCNC: 25 MMOL/L (ref 22–29)
CO2 SERPL-SCNC: 26 MMOL/L (ref 22–29)
CREAT SERPL-MCNC: 0.51 MG/DL (ref 0.57–1)
CREAT SERPL-MCNC: 0.59 MG/DL (ref 0.57–1)
DEPRECATED RDW RBC AUTO: 40.5 FL (ref 37–54)
EGFRCR SERPLBLD CKD-EPI 2021: 111.3 ML/MIN/1.73
EGFRCR SERPLBLD CKD-EPI 2021: 115.3 ML/MIN/1.73
EOSINOPHIL # BLD AUTO: 0 10*3/MM3 (ref 0–0.4)
EOSINOPHIL NFR BLD AUTO: 0 % (ref 0.3–6.2)
ERYTHROCYTE [DISTWIDTH] IN BLOOD BY AUTOMATED COUNT: 11.7 % (ref 12.3–15.4)
GLOBULIN UR ELPH-MCNC: 2.4 GM/DL
GLUCOSE SERPL-MCNC: 136 MG/DL (ref 65–99)
GLUCOSE SERPL-MCNC: 141 MG/DL (ref 65–99)
HBV SURFACE AG SERPL QL IA: NORMAL
HCT VFR BLD AUTO: 35.9 % (ref 34–46.6)
HCT VFR BLD AUTO: 38.8 % (ref 34–46.6)
HCT VFR BLD AUTO: 38.8 % (ref 34–46.6)
HGB BLD-MCNC: 12.1 G/DL (ref 12–15.9)
HGB BLD-MCNC: 13 G/DL (ref 12–15.9)
HGB BLD-MCNC: 13 G/DL (ref 12–15.9)
IMM GRANULOCYTES # BLD AUTO: 0.02 10*3/MM3 (ref 0–0.05)
IMM GRANULOCYTES NFR BLD AUTO: 0.3 % (ref 0–0.5)
LYMPHOCYTES # BLD AUTO: 1.03 10*3/MM3 (ref 0.7–3.1)
LYMPHOCYTES NFR BLD AUTO: 15.1 % (ref 19.6–45.3)
MAGNESIUM SERPL-MCNC: 1.6 MG/DL (ref 1.6–2.6)
MCH RBC QN AUTO: 32.2 PG (ref 26.6–33)
MCHC RBC AUTO-ENTMCNC: 33.5 G/DL (ref 31.5–35.7)
MCV RBC AUTO: 96 FL (ref 79–97)
MONOCYTES # BLD AUTO: 0.03 10*3/MM3 (ref 0.1–0.9)
MONOCYTES NFR BLD AUTO: 0.4 % (ref 5–12)
NEUTROPHILS NFR BLD AUTO: 5.72 10*3/MM3 (ref 1.7–7)
NEUTROPHILS NFR BLD AUTO: 84.1 % (ref 42.7–76)
NRBC BLD AUTO-RTO: 0 /100 WBC (ref 0–0.2)
PLATELET # BLD AUTO: 336 10*3/MM3 (ref 140–450)
PMV BLD AUTO: 11.5 FL (ref 6–12)
POTASSIUM SERPL-SCNC: 3.2 MMOL/L (ref 3.5–5.2)
POTASSIUM SERPL-SCNC: 3.4 MMOL/L (ref 3.5–5.2)
POTASSIUM SERPL-SCNC: 3.9 MMOL/L (ref 3.5–5.2)
PROT SERPL-MCNC: 5.9 G/DL (ref 6–8.5)
RBC # BLD AUTO: 4.04 10*6/MM3 (ref 3.77–5.28)
SODIUM SERPL-SCNC: 142 MMOL/L (ref 136–145)
SODIUM SERPL-SCNC: 143 MMOL/L (ref 136–145)
WBC NRBC COR # BLD AUTO: 6.81 10*3/MM3 (ref 3.4–10.8)

## 2023-11-21 PROCEDURE — 85018 HEMOGLOBIN: CPT | Performed by: NURSE PRACTITIONER

## 2023-11-21 PROCEDURE — G0378 HOSPITAL OBSERVATION PER HR: HCPCS

## 2023-11-21 PROCEDURE — 25810000003 LACTATED RINGERS PER 1000 ML: Performed by: NURSE PRACTITIONER

## 2023-11-21 PROCEDURE — 85025 COMPLETE CBC W/AUTO DIFF WBC: CPT | Performed by: NURSE PRACTITIONER

## 2023-11-21 PROCEDURE — 84132 ASSAY OF SERUM POTASSIUM: CPT | Performed by: STUDENT IN AN ORGANIZED HEALTH CARE EDUCATION/TRAINING PROGRAM

## 2023-11-21 PROCEDURE — 96376 TX/PRO/DX INJ SAME DRUG ADON: CPT

## 2023-11-21 PROCEDURE — 87340 HEPATITIS B SURFACE AG IA: CPT | Performed by: PHYSICIAN ASSISTANT

## 2023-11-21 PROCEDURE — 86480 TB TEST CELL IMMUN MEASURE: CPT | Performed by: PHYSICIAN ASSISTANT

## 2023-11-21 PROCEDURE — 87385 HISTOPLASMA CAPSUL AG IA: CPT | Performed by: PHYSICIAN ASSISTANT

## 2023-11-21 PROCEDURE — 83735 ASSAY OF MAGNESIUM: CPT | Performed by: NURSE PRACTITIONER

## 2023-11-21 PROCEDURE — 80053 COMPREHEN METABOLIC PANEL: CPT | Performed by: NURSE PRACTITIONER

## 2023-11-21 PROCEDURE — 85014 HEMATOCRIT: CPT | Performed by: NURSE PRACTITIONER

## 2023-11-21 PROCEDURE — 25010000002 METHYLPREDNISOLONE PER 40 MG: Performed by: PHYSICIAN ASSISTANT

## 2023-11-21 PROCEDURE — 99213 OFFICE O/P EST LOW 20 MIN: CPT | Performed by: STUDENT IN AN ORGANIZED HEALTH CARE EDUCATION/TRAINING PROGRAM

## 2023-11-21 PROCEDURE — 99214 OFFICE O/P EST MOD 30 MIN: CPT | Performed by: PHYSICIAN ASSISTANT

## 2023-11-21 RX ORDER — SODIUM CHLORIDE, SODIUM LACTATE, POTASSIUM CHLORIDE, CALCIUM CHLORIDE 600; 310; 30; 20 MG/100ML; MG/100ML; MG/100ML; MG/100ML
100 INJECTION, SOLUTION INTRAVENOUS CONTINUOUS
Status: ACTIVE | OUTPATIENT
Start: 2023-11-21 | End: 2023-11-21

## 2023-11-21 RX ORDER — ONDANSETRON 2 MG/ML
4 INJECTION INTRAMUSCULAR; INTRAVENOUS EVERY 6 HOURS PRN
Status: DISCONTINUED | OUTPATIENT
Start: 2023-11-21 | End: 2023-11-22 | Stop reason: HOSPADM

## 2023-11-21 RX ORDER — BISACODYL 10 MG
10 SUPPOSITORY, RECTAL RECTAL DAILY PRN
Status: DISCONTINUED | OUTPATIENT
Start: 2023-11-21 | End: 2023-11-22

## 2023-11-21 RX ORDER — CHOLECALCIFEROL (VITAMIN D3) 125 MCG
5 CAPSULE ORAL NIGHTLY PRN
Status: DISCONTINUED | OUTPATIENT
Start: 2023-11-21 | End: 2023-11-22 | Stop reason: HOSPADM

## 2023-11-21 RX ORDER — POTASSIUM CHLORIDE 20 MEQ/1
40 TABLET, EXTENDED RELEASE ORAL EVERY 4 HOURS
Qty: 4 TABLET | Refills: 0 | Status: COMPLETED | OUTPATIENT
Start: 2023-11-21 | End: 2023-11-21

## 2023-11-21 RX ORDER — ONDANSETRON 4 MG/1
4 TABLET, FILM COATED ORAL EVERY 6 HOURS PRN
Status: DISCONTINUED | OUTPATIENT
Start: 2023-11-21 | End: 2023-11-22 | Stop reason: HOSPADM

## 2023-11-21 RX ORDER — ACETAMINOPHEN 325 MG/1
650 TABLET ORAL EVERY 6 HOURS PRN
Status: DISCONTINUED | OUTPATIENT
Start: 2023-11-21 | End: 2023-11-22 | Stop reason: HOSPADM

## 2023-11-21 RX ORDER — POLYETHYLENE GLYCOL 3350 17 G/17G
17 POWDER, FOR SOLUTION ORAL DAILY PRN
Status: DISCONTINUED | OUTPATIENT
Start: 2023-11-21 | End: 2023-11-22

## 2023-11-21 RX ORDER — NITROGLYCERIN 0.4 MG/1
0.4 TABLET SUBLINGUAL
Status: DISCONTINUED | OUTPATIENT
Start: 2023-11-21 | End: 2023-11-22 | Stop reason: HOSPADM

## 2023-11-21 RX ORDER — AMOXICILLIN 250 MG
2 CAPSULE ORAL 2 TIMES DAILY
Status: DISCONTINUED | OUTPATIENT
Start: 2023-11-21 | End: 2023-11-22

## 2023-11-21 RX ORDER — BISACODYL 5 MG/1
5 TABLET, DELAYED RELEASE ORAL DAILY PRN
Status: DISCONTINUED | OUTPATIENT
Start: 2023-11-21 | End: 2023-11-22

## 2023-11-21 RX ADMIN — SODIUM CHLORIDE, POTASSIUM CHLORIDE, SODIUM LACTATE AND CALCIUM CHLORIDE 100 ML/HR: 600; 310; 30; 20 INJECTION, SOLUTION INTRAVENOUS at 00:36

## 2023-11-21 RX ADMIN — ACETAMINOPHEN 650 MG: 325 TABLET ORAL at 01:24

## 2023-11-21 RX ADMIN — POTASSIUM CHLORIDE 40 MEQ: 1500 TABLET, EXTENDED RELEASE ORAL at 11:06

## 2023-11-21 RX ADMIN — METHYLPREDNISOLONE SODIUM SUCCINATE 40 MG: 40 INJECTION, POWDER, FOR SOLUTION INTRAMUSCULAR; INTRAVENOUS at 06:34

## 2023-11-21 RX ADMIN — METHYLPREDNISOLONE SODIUM SUCCINATE 40 MG: 40 INJECTION, POWDER, FOR SOLUTION INTRAMUSCULAR; INTRAVENOUS at 11:06

## 2023-11-21 RX ADMIN — Medication 5 MG: at 01:24

## 2023-11-21 RX ADMIN — METHYLPREDNISOLONE SODIUM SUCCINATE 40 MG: 40 INJECTION, POWDER, FOR SOLUTION INTRAMUSCULAR; INTRAVENOUS at 02:22

## 2023-11-21 RX ADMIN — POTASSIUM CHLORIDE 40 MEQ: 1500 TABLET, EXTENDED RELEASE ORAL at 15:47

## 2023-11-21 NOTE — CONSULTS
Ana Thompson  [id number]  [id number]  1975    Patient Care Team:  Liliya Hill MD as PCP - General (Family Medicine)    Consulting Provider Chapin Kaba DO    Reason for Consult Crohn's disease    Subjective     Patient is a 48 y.o. female  With a past medical history significant for smoking who is now currently vaping and history of Crohn's disease, this was diagnosed approximately over 10 years ago.  She was on Remicade before in the past managed by [facility] for had a reaction to that subsequently came off of it.  In 2015 she presented to my partner Dr. Choudhury who ultimately performed laparoscopic ileocolic resection.  She then continued to smoke.  My partner attempted to control her on budesonide and mesalamine as money for medications became an issue.  Ultimately, she stopped coming to clinic as there is strong encouragement that she needed to get off of smoking.    During her time under my partners care, she had 2 colonoscopies which only noted disease in the terminal ileum and none in the colon.    She presented yesterday with worsening abdominal pain roughly 4-7 bowel movements per day.  She does have some occasional bleeding when wiping.    Review of Systems   Pertinent items are noted in HPI, all other systems reviewed and negative. Specifically, there is no F/C/N/V/NSAID abuse, recent abx, new/unusual HA or visual disturbances, CP/SOB. Limb swelling, gait disturbance, new rashes or arthritis.       History  Past Medical History:   Diagnosis Date    Crohn's disease      Past Surgical History:   Procedure Laterality Date    COLON SURGERY      TUBAL ABDOMINAL LIGATION       Family History   Problem Relation Age of Onset    Kidney disease Mother      Social History     Tobacco Use    Smoking status: Former     Packs/day: .5     Types: Cigarettes    Smokeless tobacco: Never   Vaping Use    Vaping Use: Every day    Substances: Nicotine, Flavoring    Devices: Refillable tank   Substance Use  "Topics    Alcohol use: No    Drug use: No     Medications Prior to Admission   Medication Sig Dispense Refill Last Dose    levocetirizine (XYZAL) 5 MG tablet Take 1 tablet by mouth Every Evening for 30 days. 30 tablet 6      Allergies:  Hydrocodone-acetaminophen, Codeine, Iodine, and Metoclopramide    Objective     Vital Signs  Blood pressure 104/65, pulse 79, temperature 97.4 °F (36.3 °C), temperature source Oral, resp. rate 20, height 172.7 cm (68\"), weight 59 kg (130 lb), SpO2 96%.  I/O last 3 completed shifts:  In: 1100 [IV Piggyback:1100]  Out: -     Physical Exam:  General Appearance: Alert and Oriented  Head: normocephalic, atraumatic  Eyes: JORGE  Neck: trachea midline  Lungs: nonlabored respirations  Heart: regular rhythm & normal rate  Rectal:  With chaperone present, no signs of fistula, she does have what appears to be a sentinel pile in the posterior midline, further exam was deferred  Abdomen: Soft, Minimally minimally tender, nondistended, well-healed laparoscopic incisions  Skin: no bruising or rash  Neurologic: Cranial Nerves grossly intact   Psych: normal      Results Review:   LABS  Results from last 7 days   Lab Units 11/21/23  0742 11/20/23  1502   WBC 10*3/mm3 6.81 8.76   HEMOGLOBIN g/dL 13.0  13.0 14.4   HEMATOCRIT % 38.8  38.8 42.1   PLATELETS 10*3/mm3 336 384     Results from last 7 days   Lab Units 11/21/23  0935 11/21/23  0742 11/20/23  1502   SODIUM mmol/L 142 143 141   POTASSIUM mmol/L 3.2* 3.9 2.6*   CHLORIDE mmol/L 103 107 100   CO2 mmol/L 26.0 25.0 33.0*   BUN mg/dL 6 6 6   CREATININE mg/dL 0.59 0.51* 0.80   GLUCOSE mg/dL 136* 141* 86   CALCIUM mg/dL 8.7 8.4* 8.8       IMAGING  Imaging Results (Last 72 Hours)       Procedure Component Value Units Date/Time    CT Abdomen Pelvis With Contrast [[id number]] Collected: 11/20/23 1708     Updated: 11/20/23 1725    Narrative:      CT ABDOMEN PELVIS W CONTRAST    Date of Exam: 11/20/2023 4:55 PM EST    Indication: h/o crohns " brbpr.    Comparison: None available.    Technique: Axial CT images were obtained of the abdomen and pelvis following the uneventful intravenous administration of Isovue-300 85 mL IV. Reconstructed coronal and sagittal images were also obtained. Automated exposure control and iterative   construction methods were used.      Findings:    Lung Bases:  No focal consolidation or effusion. There is minimal bronchiectasis and tree-in-bud opacities in the inferior lingula.      Liver:  Liver is normal in size and CT density. No focal lesions. The portal vein is patent.      Biliary/Gallbladder:   The gallbladder is normal without evidence of radiopaque stones. The biliary tree is nondilated.    Pancreas:   Pancreas is normal. There is no evidence of pancreatic mass or peripancreatic fluid.      Spleen:  Spleen is normal in size and CT density.    Gastrointestinal/Mesentery:   The stomach and duodenum appear within normal limits. There is no evidence of dilated small bowel loops. There is moderate wall thickening of the terminal ileum. There is mild hyperemia of the associated mesenteric. The appendix is surgically absent.   There is mild dilatation of the cecum, ascending, and transverse colon. There is loss of haustral markings throughout the colon. There is mild diffuse colonic wall thickening.  No free air.No mesenteric fluid collections identified.    Fat-containing umbilical hernia measures 4.8 cm in width.    Adrenals:  The right adrenal gland is unremarkable. Stable 3.3 cm smoothly marginated left adrenal nodule previously described as adenoma.      Kidneys:  Kidneys are normal in size. There are no stones or hydronephrosis. The kidneys demonstrate symmetric enhancement and contrast excretion.    Bladder:   The urinary bladder is normal.    Reproductive organs:    The uterus and ovaries appear within normal limits. Slightly dilated periuterine veins are visualized as well as dilatation of the left gonadal vein.  There is early opacification suggestive of reflux. Findings may be on the basis of pelvic congestion   syndrome in the proper setting.      Retroperitoneal/Lymph Nodes/Vasculature:  No retroperitoneal adenopathy is identified. The aorta is normal in caliber.        Bony Structures:    No acute fracture or aggressive lesions.      Impression:      Impression:    Findings compatible with terminal ileitis. Mild diffuse colonic wall thickening and loss of haustral markings. This can be seen with ulcerative colitis. There is mild dilatation of the right colon and transverse colon without discrete transition point.   This may be secondary to colonic ileus.    Stable 3.3 cm left adrenal adenoma.    Moderate-sized fat-containing umbilical hernia measuring 4.8 cm in width.        Electronically Signed: Ankit Fournier MD    11/20/2023 5:21 PM EST    Workstation ID: [id number]             I reviewed the patient's new clinical results.    Assessment & Plan       Crohn's colitis    Vapes nicotine containing substance    Hypokalemia    Hypomagnesemia    This is a 48-year-old female with a history of Crohn's disease which is always been isolated to the small bowel with a previous ileocolic resection back in 2015 who had adverse reactions to Remicade prior to this and failed to be controlled on Pentasa and budesonide who ultimately stopped seeking care for my partner due to ongoing smoking.    Today, she shows evidence of colitis as well as enteritis.  Colitis is a new finding.  Unsure if this represents worsening of her Crohn disease or some other etiology of colitis    Plan  I discussed the case with my gastroenterology colleagues  We both agreed to workup for infectious etiologies, I have already ordered C. difficile and a GI panel  If these are negative, I do believe that steroids could be in her benefit as well as scope in the near future.  Further recommendations after GI consultation and infectious workup complete.      I  discussed the patients findings and my recommendations with patient.     Arthur Dove MD  11/21/23  13:36 EST    Time: More than 50% of time spent in counseling and coordination of care:  Total face-to-face/floor time 20 min.  Time spent in counseling 15 min. Counseling included the following topics: Crohn's, treatment, smoking cessation

## 2023-11-21 NOTE — PROGRESS NOTES
[facility] Medicine Services  PROGRESS NOTE    Patient Name: Ana Thompson  : 1975  MRN: [id number]    Date of Admission: 2023  Primary Care Physician: Liliya Hill MD    Subjective   Subjective     CC:  Rectal bleed    HPI:  Patient reports improvement in abdominal discomfort but still not at baseline.  States she has not had steroids for Crohn's disease in years.  Denies fevers, chills, sweats.      Objective   Objective     Vital Signs:   Temp:  [97.4 °F (36.3 °C)-98.4 °F (36.9 °C)] 97.4 °F (36.3 °C)  Heart Rate:  [63-79] 79  Resp:  [16-20] 20  BP: (104-133)/(61-90) 104/65     Physical Exam:  General appearance: alert, awake, oriented, no acute distress   Cardiovascular: RRR, no murmurs or rubs, radial pulse full 2/4 BL   Respiratory: CTAB, no crackles or wheezes   Abdomen: soft, non-tender, no organomegaly, bowel sounds normoactive    Neuro/CNS: alert and oriented x3, normal speech    Results Reviewed:  LAB RESULTS:      Lab 23  0742 23  1502   WBC 6.81 8.76   HEMOGLOBIN 13.0  13.0 14.4   HEMATOCRIT 38.8  38.8 42.1   PLATELETS 336 384   NEUTROS ABS 5.72 5.36   IMMATURE GRANS (ABS) 0.02 0.03   LYMPHS ABS 1.03 2.51   MONOS ABS 0.03* 0.71   EOS ABS 0.00 0.11   MCV 96.0 94.2         Lab 23  0935 23  0742 23  1502   SODIUM 142 143 141   POTASSIUM 3.2* 3.9 2.6*   CHLORIDE 103 107 100   CO2 26.0 25.0 33.0*   ANION GAP 13.0 11.0 8.0   BUN 6 6 6   CREATININE 0.59 0.51* 0.80   EGFR 111.3 115.3 91.0   GLUCOSE 136* 141* 86   CALCIUM 8.7 8.4* 8.8   MAGNESIUM  --  1.6 1.2*         Lab 23  0742 23  1502   TOTAL PROTEIN 5.9* 7.0   ALBUMIN 3.5 4.2   GLOBULIN 2.4 2.8   ALT (SGPT) 9 11   AST (SGOT) 13 13   BILIRUBIN 0.3 0.4   ALK PHOS 53 63   LIPASE  --  101*                     Brief Urine Lab Results  (Last result in the past 365 days)        Color   Clarity   Blood   Leuk Est   Nitrite   Protein   CREAT   Urine HCG        23 1511                Negative               Microbiology Results Abnormal       None            CT Abdomen Pelvis With Contrast    Result Date: 11/20/2023  CT ABDOMEN PELVIS W CONTRAST Date of Exam: 11/20/2023 4:55 PM EST Indication: h/o crohns brbpr. Comparison: None available. Technique: Axial CT images were obtained of the abdomen and pelvis following the uneventful intravenous administration of Isovue-300 85 mL IV. Reconstructed coronal and sagittal images were also obtained. Automated exposure control and iterative construction methods were used. Findings: Lung Bases: No focal consolidation or effusion. There is minimal bronchiectasis and tree-in-bud opacities in the inferior lingula. Liver: Liver is normal in size and CT density. No focal lesions. The portal vein is patent. Biliary/Gallbladder:  The gallbladder is normal without evidence of radiopaque stones. The biliary tree is nondilated. Pancreas:  Pancreas is normal. There is no evidence of pancreatic mass or peripancreatic fluid. Spleen: Spleen is normal in size and CT density. Gastrointestinal/Mesentery: The stomach and duodenum appear within normal limits. There is no evidence of dilated small bowel loops. There is moderate wall thickening of the terminal ileum. There is mild hyperemia of the associated mesenteric. The appendix is surgically absent. There is mild dilatation of the cecum, ascending, and transverse colon. There is loss of haustral markings throughout the colon. There is mild diffuse colonic wall thickening.  No free air.No mesenteric fluid collections identified. Fat-containing umbilical hernia measures 4.8 cm in width. Adrenals: The right adrenal gland is unremarkable. Stable 3.3 cm smoothly marginated left adrenal nodule previously described as adenoma. Kidneys: Kidneys are normal in size. There are no stones or hydronephrosis. The kidneys demonstrate symmetric enhancement and contrast excretion. Bladder: The urinary bladder is normal.  Reproductive organs:  The uterus and ovaries appear within normal limits. Slightly dilated periuterine veins are visualized as well as dilatation of the left gonadal vein. There is early opacification suggestive of reflux. Findings may be on the basis of pelvic congestion syndrome in the proper setting. Retroperitoneal/Lymph Nodes/Vasculature: No retroperitoneal adenopathy is identified. The aorta is normal in caliber.   Bony Structures:  No acute fracture or aggressive lesions.     Impression: Impression: Findings compatible with terminal ileitis. Mild diffuse colonic wall thickening and loss of haustral markings. This can be seen with ulcerative colitis. There is mild dilatation of the right colon and transverse colon without discrete transition point. This may be secondary to colonic ileus. Stable 3.3 cm left adrenal adenoma. Moderate-sized fat-containing umbilical hernia measuring 4.8 cm in width. Electronically Signed: Ankit Fournier MD  11/20/2023 5:21 PM EST  Workstation ID: [id number]         Current medications:  Scheduled Meds:methylPREDNISolone sodium succinate, 40 mg, Intravenous, Q4H  potassium chloride ER, 40 mEq, Oral, Q4H  senna-docusate sodium, 2 tablet, Oral, BID      Continuous Infusions:   PRN Meds:.  acetaminophen    senna-docusate sodium **AND** polyethylene glycol **AND** bisacodyl **AND** bisacodyl    Calcium Replacement - Follow Nurse / BPA Driven Protocol    Magnesium Standard Dose Replacement - Follow Nurse / BPA Driven Protocol    melatonin    nitroglycerin    ondansetron **OR** ondansetron    Phosphorus Replacement - Follow Nurse / BPA Driven Protocol    Potassium Replacement - Follow Nurse / BPA Driven Protocol    Sodium Chloride (PF)    Assessment & Plan   Assessment & Plan     Active Hospital Problems    Diagnosis  POA    **Crohn's colitis [K50.10]  Yes    Vapes nicotine containing substance [Z72.0]  Yes    Hypokalemia [E87.6]  Yes    Hypomagnesemia [E83.42]  Yes      Resolved Hospital  Problems   No resolved problems to display.        Brief Hospital Course to date:  Ana Thompson is a 48 y.o. female with a past medical history significant for Crohn's disease, was admitted for bright red blood per rectum.    Crohn's colitis  -Continue Solu-Medrol  -Colorectal surgery consulted  -Analgesics and antiemetics as needed    Hypokalemia  -Replenish as needed  -BMP in a.m.    Nicotine dependence      Expected Discharge Location and Transportation: Home  Expected Discharge   Expected Discharge Date: 11/22/2023; Expected Discharge Time:      DVT prophylaxis:  Mechanical DVT prophylaxis orders are present.     AM-PAC 6 Clicks Score (PT): 24 (11/21/23 0800)    CODE STATUS:   Code Status and Medical Interventions:   Ordered at: 11/20/23 2021     Level Of Support Discussed With:    Patient     Code Status (Patient has no pulse and is not breathing):    CPR (Attempt to Resuscitate)     Medical Interventions (Patient has pulse or is breathing):    Full Support       Chapin Kaba, DO  11/21/23

## 2023-11-21 NOTE — PLAN OF CARE
Problem: Adult Inpatient Plan of Care  Goal: Absence of Hospital-Acquired Illness or Injury  Intervention: Identify and Manage Fall Risk  Recent Flowsheet Documentation  Taken 11/21/2023 0800 by Gisella Cramer, RN  Safety Promotion/Fall Prevention:   activity supervised   assistive device/personal items within reach   clutter free environment maintained   fall prevention program maintained   toileting scheduled   safety round/check completed   room organization consistent   nonskid shoes/slippers when out of bed  Intervention: Prevent Infection  Recent Flowsheet Documentation  Taken 11/21/2023 0800 by Gisella Cramer, RN  Infection Prevention: environmental surveillance performed   Goal Outcome Evaluation:

## 2023-11-21 NOTE — CONSULTS
[facility] [place] [facility] [place]    Referring Provider: Chapin Kaba DO     PCP: Liliya Hill MD    Reason for Consultation: Crohn's disease with terminal ileitis     Chief complaint: Diarrhea and abdominal pain     History of present illness:    Ana Thompson is a 48 y.o. female who is admitted with diarrhea and abdominal pain.    She has a history of Crohn's disease diagnosed in 2007.   She received Remicade for approximately 2 months and then discontinued it secondary to temporary right sided paralysis.  She had a laparoscopic ileocolic resection with Dr. Heather Choudhury in 2015.   She was remotely tried on Mesalamine and Budesonide.  She stopped returning to the clinic as she was told she had to stop smoking to continue care.  She has since stopped smoking but now vapes.      She describes 3-7 watery bowel movements daily.  She has lower abdominal pain, predominately right sided.   She has rectal pain with bowel movements and had small blood with passage of stool yesterday.        CT abdomen/pelvis shows terminal ileitis and mild diffuse colonic wall thickening and loss of haustral markings with mild dilatation of the right colon and transverse colon.      Allergies:  Hydrocodone-acetaminophen, Codeine, Iodine, and Metoclopramide    Scheduled Meds:  methylPREDNISolone sodium succinate, 40 mg, Intravenous, Q4H  potassium chloride ER, 40 mEq, Oral, Q4H  senna-docusate sodium, 2 tablet, Oral, BID       Infusions:       PRN Meds:    acetaminophen    senna-docusate sodium **AND** polyethylene glycol **AND** bisacodyl **AND** bisacodyl    Calcium Replacement - Follow Nurse / BPA Driven Protocol    Magnesium Standard Dose Replacement - Follow Nurse / BPA Driven Protocol    melatonin    nitroglycerin    ondansetron **OR** ondansetron    Phosphorus Replacement - Follow Nurse / BPA Driven Protocol    Potassium Replacement - Follow Nurse / BPA Driven Protocol    Sodium Chloride (PF)    Home Meds:  Medications Prior  "to Admission   Medication Sig Dispense Refill Last Dose    levocetirizine (XYZAL) 5 MG tablet Take 1 tablet by mouth Every Evening for 30 days. 30 tablet 6      ROS: Review of Systems   Constitutional: Negative.    HENT: Negative.     Eyes: Negative.    Respiratory: Negative.     Cardiovascular: Negative.    Gastrointestinal:  Positive for abdominal pain and diarrhea.   Endocrine: Negative.    Genitourinary: Negative.    Musculoskeletal: Negative.    Skin: Negative.    Allergic/Immunologic: Negative.    Neurological: Negative.    Hematological: Negative.    Psychiatric/Behavioral: Negative.         PAST MED HX:  Past Medical History:   Diagnosis Date    Crohn's disease      PAST SURG HX:  Past Surgical History:   Procedure Laterality Date    COLON SURGERY      TUBAL ABDOMINAL LIGATION       FAM HX:  Family History   Problem Relation Age of Onset    Kidney disease Mother      SOC HX:  Social History     Socioeconomic History    Marital status: Single   Tobacco Use    Smoking status: Former     Packs/day: .5     Types: Cigarettes    Smokeless tobacco: Never   Vaping Use    Vaping Use: Every day    Substances: Nicotine, Flavoring    Devices: Refillable tank   Substance and Sexual Activity    Alcohol use: No    Drug use: No    Sexual activity: Defer     PHYSICAL EXAM  /65 (BP Location: Left arm, Patient Position: Lying)   Pulse 64   Temp 97.4 °F (36.3 °C) (Oral)   Resp 20   Ht 172.7 cm (68\")   Wt 59 kg (130 lb)   SpO2 96%   BMI 19.77 kg/m²   Wt Readings from Last 3 Encounters:   11/20/23 59 kg (130 lb)   08/22/23 61.2 kg (135 lb)   04/19/23 61.2 kg (135 lb)   ,body mass index is 19.77 kg/m².  Physical Exam  Constitutional:       General: She is not in acute distress.  HENT:      Head: Normocephalic and atraumatic.      Mouth/Throat:      Mouth: Mucous membranes are moist.   Eyes:      General: No scleral icterus.  Cardiovascular:      Rate and Rhythm: Normal rate and regular rhythm.   Pulmonary:      " Effort: Pulmonary effort is normal. No respiratory distress.   Abdominal:      General: Bowel sounds are normal. There is no distension.      Palpations: Abdomen is soft.      Tenderness: There is no guarding.      Comments: Mild tenderness to the right lower quadrant    Skin:     General: Skin is warm and dry.   Neurological:      Mental Status: She is alert and oriented to person, place, and time.   Psychiatric:         Behavior: Behavior normal.       Results Review:   I reviewed the patient's new clinical results.    Lab Results   Component Value Date    WBC 6.81 11/21/2023    HGB 13.0 11/21/2023    HGB 13.0 11/21/2023    HGB 14.4 11/20/2023    HCT 38.8 11/21/2023    HCT 38.8 11/21/2023    MCV 96.0 11/21/2023     11/21/2023     Lab Results   Component Value Date    INR 0.91 09/21/2015     Lab Results   Component Value Date    GLUCOSE 136 (H) 11/21/2023    BUN 6 11/21/2023    CREATININE 0.59 11/21/2023    EGFRIFNONA 97 04/28/2021    BCR 10.2 11/21/2023     11/21/2023    K 3.2 (L) 11/21/2023    CO2 26.0 11/21/2023    CALCIUM 8.7 11/21/2023    ALBUMIN 3.5 11/21/2023    ALKPHOS 53 11/21/2023    BILITOT 0.3 11/21/2023    ALT 9 11/21/2023    AST 13 11/21/2023     CT Abdomen/pelvis with contrast: (as interpreted by radiologist)  Findings:   Lung Bases:  No focal consolidation or effusion. There is minimal bronchiectasis and tree-in-bud opacities in the inferior lingula.  Liver:  Liver is normal in size and CT density. No focal lesions. The portal vein is patent.   Biliary/Gallbladder:   The gallbladder is normal without evidence of radiopaque stones. The biliary tree is nondilated.   Pancreas:   Pancreas is normal. There is no evidence of pancreatic mass or peripancreatic fluid.   Spleen:  Spleen is normal in size and CT density.   Gastrointestinal/Mesentery:   The stomach and duodenum appear within normal limits. There is no evidence of dilated small bowel loops. There is moderate wall thickening of the  terminal ileum. There is mild hyperemia of the associated mesenteric. The appendix is surgically absent. There is mild dilatation of the cecum, ascending, and transverse colon. There is loss of haustral markings throughout the colon. There is mild diffuse colonic wall thickening.  No free air.No mesenteric fluid collections identified.   Fat-containing umbilical hernia measures 4.8 cm in width.   Adrenals:  The right adrenal gland is unremarkable. Stable 3.3 cm smoothly marginated left adrenal nodule previously described as adenoma.   Kidneys:  Kidneys are normal in size. There are no stones or hydronephrosis. The kidneys demonstrate symmetric enhancement and contrast excretion.   Bladder:   The urinary bladder is normal.   Reproductive organs:    The uterus and ovaries appear within normal limits. Slightly dilated periuterine veins are visualized as well as dilatation of the left gonadal vein. There is early opacification suggestive of reflux. Findings may be on the basis of pelvic congestion syndrome in the proper setting.   Retroperitoneal/Lymph Nodes/Vasculature:  No retroperitoneal adenopathy is identified. The aorta is normal in caliber.    Bony Structures:    No acute fracture or aggressive lesions.   IMPRESSION:  Impression:   Findings compatible with terminal ileitis. Mild diffuse colonic wall thickening and loss of haustral markings. This can be seen with ulcerative colitis. There is mild dilatation of the right colon and transverse colon without discrete transition point.   This may be secondary to colonic ileus.   Stable 3.3 cm left adrenal adenoma.   Moderate-sized fat-containing umbilical hernia measuring 4.8 cm in width.    ASSESSMENTS/PLANS    Crohn's ileocolitis   Diarrhea, likely secondary to above.  Will exclude infectious sources.    History of tobacco abuse, currently vapes nicotine containing substance   Hypokalemia     >> Will exclude infectious source.   Obtain C. Difficile toxin and GI panel  PCR  >> Check CRP and fecal calprotectin  >> If infectious work up is negative will begin steroids   >> Recommend outpatient colonoscopy.  >> Will check Hepatitis B surface Ag, quantiferon TB and urine histo in anticipation of needing biologic therapy initiated outpatient.       I discussed the patient's findings and my recommendations with patient    FAWN Love  11/21/23  14:02 EST

## 2023-11-21 NOTE — H&P
[facility] Medicine Services  HISTORY AND PHYSICAL    Patient Name: Ana Thompson  : 1975  MRN: [id number]  Primary Care Physician: Liliya Hill MD  Date of admission: 2023    Subjective   Subjective     Chief Complaint:  Rectal bleeding, frequent bowel movements     HPI:  Ana Thompson is a 48 y.o. female with a past medical history significant for tobacco use and crohn's disease presents to the ED with complaints of bright red blood per rectum and frequent bowel movements.  She reports her symptoms have been intermittent over the past couple of weeks but over the past couple of days her symptoms have worsened. She reports generalized abdominal pain and nausea but denies any fever, chills, cough, shortness of air, dysuria or melena.  Patient does report a poor appetite along with fatigue and dizziness.  She states she was diagnosed with crohn's disease over 10 years ago.  Her last colonoscopy was in 2017 with Dr. Choudhury.  She tried remicaide however she reports paralysis and she took herself off the medication.  Per patient she was told she would likely need an ostomy and she declined.  Since then she has not been on any treatment.  She tells me she has no insurance and cannot afford the medications. Workup in the ED tonight included CT of abdomen and pelvis that showed  findings compatible with terminal ileitis.  Mild diffuse colonic wall thickening and loss of haustral markings.  Mild dilatation of the right colon and transverse colon without discrete transition point.  This may be secondary to colonic ileus.  Stable 3.3 cm left adrenal adenoma.  Moderate size fat containing umbilical hernia.          Review of Systems   Constitutional:  Positive for appetite change and fatigue. Negative for activity change, chills, diaphoresis and fever.   HENT: Negative.     Eyes:  Negative for photophobia and visual disturbance.   Respiratory:  Negative for cough and shortness  "of breath.    Cardiovascular:  Negative for chest pain, palpitations and leg swelling.   Gastrointestinal:  Positive for abdominal distention, abdominal pain, blood in stool and nausea. Negative for constipation and vomiting.   Genitourinary: Negative.    Musculoskeletal:  Negative for back pain, gait problem and neck stiffness.   Skin: Negative.    Neurological:  Positive for dizziness, weakness and light-headedness. Negative for tremors, syncope, facial asymmetry, speech difficulty, numbness and headaches.   Psychiatric/Behavioral: Negative.                  Personal History     Past Medical History:   Diagnosis Date    Crohn's disease              Past Surgical History:   Procedure Laterality Date    COLON SURGERY      TUBAL ABDOMINAL LIGATION         Family History:  family history includes Kidney disease in her mother.     Social History:  reports that she has quit smoking. Her smoking use included cigarettes. She smoked an average of .5 packs per day. She has never used smokeless tobacco. She reports that she does not drink alcohol and does not use drugs.  Social History     Social History Narrative    Not on file       Medications:  levocetirizine    Allergies   Allergen Reactions    Hydrocodone-Acetaminophen Itching    Codeine Other (See Comments)     UNKNOWN  Other reaction(s): Other (See Comments)  UNKNOWN    Iodine Other (See Comments)     BURNS    Metoclopramide Anxiety     Other reaction(s): \"it makes me looney\"       Objective   Objective     Vital Signs:   Temp:  [98.1 °F (36.7 °C)] 98.1 °F (36.7 °C)  Heart Rate:  [66-74] 68  Resp:  [16] 16  BP: (105-123)/(61-90) 114/73    Physical Exam  Vitals and nursing note reviewed.   Constitutional:       General: She is not in acute distress.     Appearance: Normal appearance. She is not ill-appearing, toxic-appearing or diaphoretic.   HENT:      Head: Normocephalic and atraumatic.      Nose: Nose normal.      Mouth/Throat:      Mouth: Mucous membranes are dry. "      Pharynx: Oropharynx is clear.   Eyes:      Extraocular Movements: Extraocular movements intact.      Conjunctiva/sclera: Conjunctivae normal.      Pupils: Pupils are equal, round, and reactive to light.   Cardiovascular:      Rate and Rhythm: Normal rate and regular rhythm.      Pulses: Normal pulses.      Heart sounds: Normal heart sounds.   Pulmonary:      Effort: Pulmonary effort is normal.      Breath sounds: Normal breath sounds.   Abdominal:      General: Bowel sounds are normal. There is no distension.      Palpations: Abdomen is soft. There is no mass.      Tenderness: There is abdominal tenderness. There is no right CVA tenderness, left CVA tenderness, guarding or rebound.      Hernia: No hernia is present.      Comments: Generalized tenderness throughout    Musculoskeletal:         General: No swelling, tenderness, deformity or signs of injury. Normal range of motion.      Cervical back: Normal range of motion and neck supple.      Right lower leg: No edema.      Left lower leg: No edema.   Skin:     General: Skin is warm and dry.   Neurological:      General: No focal deficit present.      Mental Status: She is alert and oriented to person, place, and time. Mental status is at baseline.   Psychiatric:         Mood and Affect: Mood normal.         Behavior: Behavior normal.         Thought Content: Thought content normal.         Judgment: Judgment normal.            Result Review:  I have personally reviewed the results from the time of this admission to 11/20/2023 20:07 EST and agree with these findings:  [x]  Laboratory list / accordion  [x]  Microbiology  [x]  Radiology  [x]  EKG/Telemetry   []  Cardiology/Vascular   []  Pathology  []  Old records  []  Other:  Most notable findings include:     LAB RESULTS:      Lab 11/20/23  1502   WBC 8.76   HEMOGLOBIN 14.4   HEMATOCRIT 42.1   PLATELETS 384   NEUTROS ABS 5.36   IMMATURE GRANS (ABS) 0.03   LYMPHS ABS 2.51   MONOS ABS 0.71   EOS ABS 0.11   MCV  94.2         Lab 11/20/23  1502   SODIUM 141   POTASSIUM 2.6*   CHLORIDE 100   CO2 33.0*   ANION GAP 8.0   BUN 6   CREATININE 0.80   EGFR 91.0   GLUCOSE 86   CALCIUM 8.8   MAGNESIUM 1.2*         Lab 11/20/23  1502   TOTAL PROTEIN 7.0   ALBUMIN 4.2   GLOBULIN 2.8   ALT (SGPT) 11   AST (SGOT) 13   BILIRUBIN 0.4   ALK PHOS 63   LIPASE 101*                     Brief Urine Lab Results  (Last result in the past 365 days)        Color   Clarity   Blood   Leuk Est   Nitrite   Protein   CREAT   Urine HCG        11/20/23 1511               Negative             Microbiology Results (last 10 days)       ** No results found for the last 240 hours. **            CT Abdomen Pelvis With Contrast    Result Date: 11/20/2023  CT ABDOMEN PELVIS W CONTRAST Date of Exam: 11/20/2023 4:55 PM EST Indication: h/o crohns brbpr. Comparison: None available. Technique: Axial CT images were obtained of the abdomen and pelvis following the uneventful intravenous administration of Isovue-300 85 mL IV. Reconstructed coronal and sagittal images were also obtained. Automated exposure control and iterative construction methods were used. Findings: Lung Bases: No focal consolidation or effusion. There is minimal bronchiectasis and tree-in-bud opacities in the inferior lingula. Liver: Liver is normal in size and CT density. No focal lesions. The portal vein is patent. Biliary/Gallbladder:  The gallbladder is normal without evidence of radiopaque stones. The biliary tree is nondilated. Pancreas:  Pancreas is normal. There is no evidence of pancreatic mass or peripancreatic fluid. Spleen: Spleen is normal in size and CT density. Gastrointestinal/Mesentery: The stomach and duodenum appear within normal limits. There is no evidence of dilated small bowel loops. There is moderate wall thickening of the terminal ileum. There is mild hyperemia of the associated mesenteric. The appendix is surgically absent. There is mild dilatation of the cecum, ascending, and  transverse colon. There is loss of haustral markings throughout the colon. There is mild diffuse colonic wall thickening.  No free air.No mesenteric fluid collections identified. Fat-containing umbilical hernia measures 4.8 cm in width. Adrenals: The right adrenal gland is unremarkable. Stable 3.3 cm smoothly marginated left adrenal nodule previously described as adenoma. Kidneys: Kidneys are normal in size. There are no stones or hydronephrosis. The kidneys demonstrate symmetric enhancement and contrast excretion. Bladder: The urinary bladder is normal. Reproductive organs:  The uterus and ovaries appear within normal limits. Slightly dilated periuterine veins are visualized as well as dilatation of the left gonadal vein. There is early opacification suggestive of reflux. Findings may be on the basis of pelvic congestion syndrome in the proper setting. Retroperitoneal/Lymph Nodes/Vasculature: No retroperitoneal adenopathy is identified. The aorta is normal in caliber.   Bony Structures:  No acute fracture or aggressive lesions.     Impression: Impression: Findings compatible with terminal ileitis. Mild diffuse colonic wall thickening and loss of haustral markings. This can be seen with ulcerative colitis. There is mild dilatation of the right colon and transverse colon without discrete transition point. This may be secondary to colonic ileus. Stable 3.3 cm left adrenal adenoma. Moderate-sized fat-containing umbilical hernia measuring 4.8 cm in width. Electronically Signed: Ankit Fournier MD  11/20/2023 5:21 PM EST  Workstation ID: [id number]         Assessment & Plan   Assessment & Plan       Crohn's colitis    Vapes nicotine containing substance    Hypokalemia    Hypomagnesemia      48 year old female presents to the ED with complaints of frequent bowel movements and bright red blood per rectum that has progressively worsened over the past week.     1) Crohn's colitis   -CT of abdomen and pelvis mentioned  above  -continue steroids  -consult colorectal in am   -IVF   -pain control   -prn anti-emetics   -consult CM, patient reports difficulty in the past affording medications     2) Hypokalemia      Hypomagnesemia  -replace per protocol   -telemetry monitoring     3) Vapes nicotine containing substance   -cessation counseling provided   -declines nicotine patch        DVT prophylaxis:  scds    CODE STATUS:  Full Code        Expected Discharge  TBD     This note has been completed as part of a split-shared workflow.     Signature: Electronically signed by GONSALO Verde, 11/20/23, 8:17 PM EST.    Total time spent: 70 mins  Time spent includes time reviewing chart, face-to-face time, counseling patient/family/caregiver, ordering medications/tests/procedures, communicating with other health care professionals, documenting clinical information in the electronic health record, and coordination of care.

## 2023-11-21 NOTE — PLAN OF CARE
Goal Outcome Evaluation:  Plan of Care Reviewed With: patient        Progress: no change         Problem: Adult Inpatient Plan of Care  Goal: Absence of Hospital-Acquired Illness or Injury  Intervention: Identify and Manage Fall Risk  Recent Flowsheet Documentation  Taken 11/21/2023 0000 by Fermin Nunez RN  Safety Promotion/Fall Prevention: activity supervised  Intervention: Prevent Skin Injury  Recent Flowsheet Documentation  Taken 11/21/2023 0000 by Fermin Nunez RN  Body Position: position changed independently  Intervention: Prevent and Manage VTE (Venous Thromboembolism) Risk  Recent Flowsheet Documentation  Taken 11/21/2023 0000 by Fermin Nunez RN  Activity Management:   ambulated in room   ambulated to bathroom     Problem: Adult Inpatient Plan of Care  Goal: Absence of Hospital-Acquired Illness or Injury  Intervention: Prevent Skin Injury  Recent Flowsheet Documentation  Taken 11/21/2023 0000 by Fermin Nunez RN  Body Position: position changed independently     Problem: Adult Inpatient Plan of Care  Goal: Absence of Hospital-Acquired Illness or Injury  Intervention: Prevent and Manage VTE (Venous Thromboembolism) Risk  Recent Flowsheet Documentation  Taken 11/21/2023 0000 by Fermin Nunez RN  Activity Management:   ambulated in room   ambulated to bathroom

## 2023-11-21 NOTE — CASE MANAGEMENT/SOCIAL WORK
Discharge Planning Assessment  [facility]     Patient Name: Ana Thompson  MRN: [id number]  Today's Date: 11/21/2023    Admit Date: 11/20/2023    Plan: IDP   Discharge Needs Assessment       Row Name 11/21/23 0848       Living Environment    People in Home alone    Current Living Arrangements home    Potentially Unsafe Housing Conditions none    In the past 12 months has the electric, gas, oil, or water company threatened to shut off services in your home? No    Primary Care Provided by self    Provides Primary Care For no one    Family Caregiver if Needed none    Quality of Family Relationships unable to assess    Able to Return to Prior Arrangements yes       Resource/Environmental Concerns    Resource/Environmental Concerns none    Transportation Concerns none       Food Insecurity    Within the past 12 months, you worried that your food would run out before you got the money to buy more. Never true    Within the past 12 months, the food you bought just didn't last and you didn't have money to get more. Never true       Transition Planning    Patient/Family Anticipates Transition to home with family    Patient/Family Anticipated Services at Transition none    Transportation Anticipated family or friend will provide       Discharge Needs Assessment    Readmission Within the Last 30 Days no previous admission in last 30 days    Equipment Currently Used at Home none    Concerns to be Addressed discharge planning    Anticipated Changes Related to Illness none    Equipment Needed After Discharge none                   Discharge Plan       Row Name 11/21/23 0850       Plan    Plan IDP    Patient/Family in Agreement with Plan yes    Plan Comments Met with Ms. Thompson at the bedside to initiate discharge plan. Ms. Thompson lives alone in [place]. She is independent with activities of daily living and is employed. Her primary care is Dr. Liliya Hill. Patient currently has no health insurance and reported she  cannot afford medications. She has no DME, home health, or outpatient services. Her discharge plan is home.  will consult social work for health insurance resources.  will continue to follow plan of care and assist with discharge planning needs.    Final Discharge Disposition Code 01 - home or self-care                  Continued Care and Services - Admitted Since 11/20/2023    Coordination has not been started for this encounter.          Demographic Summary       Row Name 11/21/23 0846       General Information    Admission Type observation    Arrived From home    Referral Source admission list    Reason for Consult discharge planning    Preferred Language English       Contact Information    Permission Granted to Share Info With     Contact Information Obtained for     Contact Information Comments Eva Kat [phone]                   Functional Status       Row Name 11/21/23 0847       Functional Status    Usual Activity Tolerance good    Current Activity Tolerance other (see comments)  cathie       Physical Activity    On average, how many days per week do you engage in moderate to strenuous exercise (like a brisk walk)? 4 days    On average, how many minutes do you engage in exercise at this level? 30 min    Number of minutes of exercise per week 120       Assessment of Health Literacy    How often do you have someone help you read hospital materials? Never    How often do you have problems learning about your medical condition because of difficulty understanding written information? Never    How often do you have a problem understanding what is told to you about your medical condition? Never    How confident are you filling out medical forms by yourself? Quite a bit    Health Literacy Good       Functional Status, IADL    Medications independent    Meal Preparation independent    Housekeeping independent    Laundry independent    Shopping independent        Mental Status    General Appearance WDL WDL       Mental Status Summary    Recent Changes in Mental Status/Cognitive Functioning no changes       Employment/    Employment Status employed full-time                   Psychosocial    No documentation.                  Abuse/Neglect    No documentation.                  Legal    No documentation.                  Substance Abuse    No documentation.                  Patient Forms    No documentation.                     Brisa Marquez RN

## 2023-11-22 ENCOUNTER — READMISSION MANAGEMENT (OUTPATIENT)
Dept: CALL CENTER | Facility: HOSPITAL | Age: 48
End: 2023-11-22

## 2023-11-22 VITALS
TEMPERATURE: 97.7 F | WEIGHT: 122.31 LBS | HEART RATE: 93 BPM | DIASTOLIC BLOOD PRESSURE: 70 MMHG | BODY MASS INDEX: 18.54 KG/M2 | SYSTOLIC BLOOD PRESSURE: 117 MMHG | HEIGHT: 68 IN | RESPIRATION RATE: 20 BRPM | OXYGEN SATURATION: 96 %

## 2023-11-22 PROBLEM — E87.6 HYPOKALEMIA: Status: RESOLVED | Noted: 2023-11-20 | Resolved: 2023-11-22

## 2023-11-22 LAB
ADV 40+41 DNA STL QL NAA+NON-PROBE: NOT DETECTED
ASTRO TYP 1-8 RNA STL QL NAA+NON-PROBE: NOT DETECTED
C CAYETANENSIS DNA STL QL NAA+NON-PROBE: NOT DETECTED
C COLI+JEJ+UPSA DNA STL QL NAA+NON-PROBE: NOT DETECTED
C DIFF TOX GENS STL QL NAA+PROBE: NOT DETECTED
CRP SERPL-MCNC: <0.3 MG/DL (ref 0–0.5)
CRYPTOSP DNA STL QL NAA+NON-PROBE: NOT DETECTED
E HISTOLYT DNA STL QL NAA+NON-PROBE: NOT DETECTED
EAEC PAA PLAS AGGR+AATA ST NAA+NON-PRB: NOT DETECTED
EC STX1+STX2 GENES STL QL NAA+NON-PROBE: NOT DETECTED
EPEC EAE GENE STL QL NAA+NON-PROBE: NOT DETECTED
ETEC LTA+ST1A+ST1B TOX ST NAA+NON-PROBE: NOT DETECTED
G LAMBLIA DNA STL QL NAA+NON-PROBE: NOT DETECTED
HCT VFR BLD AUTO: 31.8 % (ref 34–46.6)
HCT VFR BLD AUTO: 34.2 % (ref 34–46.6)
HGB BLD-MCNC: 10.9 G/DL (ref 12–15.9)
HGB BLD-MCNC: 11.4 G/DL (ref 12–15.9)
NOROVIRUS GI+II RNA STL QL NAA+NON-PROBE: NOT DETECTED
P SHIGELLOIDES DNA STL QL NAA+NON-PROBE: NOT DETECTED
POTASSIUM SERPL-SCNC: 4.5 MMOL/L (ref 3.5–5.2)
QT INTERVAL: 434 MS
QTC INTERVAL: 454 MS
RVA RNA STL QL NAA+NON-PROBE: NOT DETECTED
S ENT+BONG DNA STL QL NAA+NON-PROBE: NOT DETECTED
SAPO I+II+IV+V RNA STL QL NAA+NON-PROBE: NOT DETECTED
SHIGELLA SP+EIEC IPAH ST NAA+NON-PROBE: NOT DETECTED
V CHOL+PARA+VUL DNA STL QL NAA+NON-PROBE: NOT DETECTED
V CHOLERAE DNA STL QL NAA+NON-PROBE: NOT DETECTED
Y ENTEROCOL DNA STL QL NAA+NON-PROBE: NOT DETECTED

## 2023-11-22 PROCEDURE — 86140 C-REACTIVE PROTEIN: CPT | Performed by: PHYSICIAN ASSISTANT

## 2023-11-22 PROCEDURE — 99212 OFFICE O/P EST SF 10 MIN: CPT | Performed by: PHYSICIAN ASSISTANT

## 2023-11-22 PROCEDURE — 63710000001 PREDNISONE PER 1 MG: Performed by: PHYSICIAN ASSISTANT

## 2023-11-22 PROCEDURE — 85018 HEMOGLOBIN: CPT | Performed by: NURSE PRACTITIONER

## 2023-11-22 PROCEDURE — 85014 HEMATOCRIT: CPT | Performed by: NURSE PRACTITIONER

## 2023-11-22 PROCEDURE — 87493 C DIFF AMPLIFIED PROBE: CPT | Performed by: STUDENT IN AN ORGANIZED HEALTH CARE EDUCATION/TRAINING PROGRAM

## 2023-11-22 PROCEDURE — 87507 IADNA-DNA/RNA PROBE TQ 12-25: CPT | Performed by: STUDENT IN AN ORGANIZED HEALTH CARE EDUCATION/TRAINING PROGRAM

## 2023-11-22 PROCEDURE — 84132 ASSAY OF SERUM POTASSIUM: CPT | Performed by: STUDENT IN AN ORGANIZED HEALTH CARE EDUCATION/TRAINING PROGRAM

## 2023-11-22 PROCEDURE — G0378 HOSPITAL OBSERVATION PER HR: HCPCS

## 2023-11-22 PROCEDURE — 83993 ASSAY FOR CALPROTECTIN FECAL: CPT | Performed by: PHYSICIAN ASSISTANT

## 2023-11-22 PROCEDURE — 99239 HOSP IP/OBS DSCHRG MGMT >30: CPT | Performed by: STUDENT IN AN ORGANIZED HEALTH CARE EDUCATION/TRAINING PROGRAM

## 2023-11-22 RX ORDER — ONDANSETRON 4 MG/1
4 TABLET, FILM COATED ORAL EVERY 6 HOURS PRN
Qty: 20 TABLET | Refills: 0 | Status: SHIPPED | OUTPATIENT
Start: 2023-11-22

## 2023-11-22 RX ORDER — LIDOCAINE 40 MG/G
1 CREAM TOPICAL EVERY 4 HOURS PRN
Status: DISCONTINUED | OUTPATIENT
Start: 2023-11-22 | End: 2023-11-22 | Stop reason: HOSPADM

## 2023-11-22 RX ORDER — PREDNISONE 10 MG/1
TABLET ORAL
Qty: 70 TABLET | Refills: 0 | Status: SHIPPED | OUTPATIENT
Start: 2023-11-22 | End: 2023-12-20

## 2023-11-22 RX ORDER — LIDOCAINE 40 MG/G
1 CREAM TOPICAL EVERY 4 HOURS PRN
Qty: 5 G | Refills: 0 | Status: SHIPPED | OUTPATIENT
Start: 2023-11-22 | End: 2023-11-29

## 2023-11-22 RX ORDER — POTASSIUM CHLORIDE 20 MEQ/1
40 TABLET, EXTENDED RELEASE ORAL EVERY 4 HOURS
Status: COMPLETED | OUTPATIENT
Start: 2023-11-22 | End: 2023-11-22

## 2023-11-22 RX ORDER — PREDNISONE 20 MG/1
40 TABLET ORAL
Status: DISCONTINUED | OUTPATIENT
Start: 2023-11-22 | End: 2023-11-22 | Stop reason: HOSPADM

## 2023-11-22 RX ADMIN — POTASSIUM CHLORIDE 40 MEQ: 1500 TABLET, EXTENDED RELEASE ORAL at 05:27

## 2023-11-22 RX ADMIN — POTASSIUM CHLORIDE 40 MEQ: 1500 TABLET, EXTENDED RELEASE ORAL at 01:31

## 2023-11-22 RX ADMIN — PREDNISONE 40 MG: 20 TABLET ORAL at 11:08

## 2023-11-22 NOTE — PLAN OF CARE
Problem: Adult Inpatient Plan of Care  Goal: Plan of Care Review  Outcome: Ongoing, Progressing  Goal: Patient-Specific Goal (Individualized)  Outcome: Ongoing, Progressing  Goal: Absence of Hospital-Acquired Illness or Injury  Outcome: Ongoing, Progressing  Intervention: Identify and Manage Fall Risk  Recent Flowsheet Documentation  Taken 11/22/2023 0600 by Elizabeth Jalloh RN  Safety Promotion/Fall Prevention:   assistive device/personal items within reach   clutter free environment maintained   fall prevention program maintained   nonskid shoes/slippers when out of bed   room organization consistent   safety round/check completed  Taken 11/22/2023 0400 by Elizabeth Jalloh RN  Safety Promotion/Fall Prevention:   assistive device/personal items within reach   clutter free environment maintained   fall prevention program maintained   nonskid shoes/slippers when out of bed   room organization consistent   safety round/check completed  Taken 11/22/2023 0200 by Elizabeth Jalloh RN  Safety Promotion/Fall Prevention:   assistive device/personal items within reach   clutter free environment maintained   fall prevention program maintained   nonskid shoes/slippers when out of bed   room organization consistent   safety round/check completed  Taken 11/22/2023 0000 by Elizabeth Jalloh RN  Safety Promotion/Fall Prevention:   assistive device/personal items within reach   clutter free environment maintained   fall prevention program maintained   nonskid shoes/slippers when out of bed   room organization consistent   safety round/check completed  Taken 11/21/2023 2200 by Elizabeth Jalloh RN  Safety Promotion/Fall Prevention:   assistive device/personal items within reach   clutter free environment maintained   fall prevention program maintained   nonskid shoes/slippers when out of bed   room organization consistent   safety round/check completed  Taken 11/21/2023 2000 by Elizabeth Jalloh RN  Safety Promotion/Fall  Prevention:   assistive device/personal items within reach   clutter free environment maintained   fall prevention program maintained   nonskid shoes/slippers when out of bed   room organization consistent   safety round/check completed  Intervention: Prevent Skin Injury  Recent Flowsheet Documentation  Taken 11/22/2023 0600 by Elizabeth Jalloh RN  Body Position:   position changed independently   weight shifting  Skin Protection:   tubing/devices free from skin contact   transparent dressing maintained   skin-to-device areas padded  Taken 11/22/2023 0400 by Elizabeth Jalloh RN  Body Position:   position changed independently   weight shifting  Skin Protection:   tubing/devices free from skin contact   transparent dressing maintained   skin-to-device areas padded  Taken 11/22/2023 0200 by Elizabeth Jalloh RN  Body Position:   position changed independently   weight shifting  Skin Protection:   tubing/devices free from skin contact   transparent dressing maintained   skin-to-device areas padded  Taken 11/22/2023 0000 by Elizabeth Jalloh RN  Body Position:   position changed independently   weight shifting  Skin Protection:   tubing/devices free from skin contact   transparent dressing maintained   skin-to-device areas padded  Taken 11/21/2023 2200 by Elizabeth Jalloh RN  Body Position:   position changed independently   weight shifting  Skin Protection:   tubing/devices free from skin contact   transparent dressing maintained   skin-to-device areas padded  Taken 11/21/2023 2000 by Elizabeth Jalloh RN  Body Position:   position changed independently   weight shifting  Skin Protection:   tubing/devices free from skin contact   transparent dressing maintained   skin-to-device areas padded  Intervention: Prevent and Manage VTE (Venous Thromboembolism) Risk  Recent Flowsheet Documentation  Taken 11/22/2023 0600 by Elizabeth Jalloh RN  Activity Management: activity encouraged  Taken 11/22/2023 0400 by Elizabeth Jalloh  C, RN  Activity Management: activity encouraged  Taken 11/22/2023 0200 by Elizabeth Jalloh RN  Activity Management: activity encouraged  Taken 11/22/2023 0000 by Elizabeth Jalloh RN  Activity Management: activity encouraged  Taken 11/21/2023 2200 by Elizabeth Jalloh RN  Activity Management: activity encouraged  Taken 11/21/2023 2000 by Elizabeth Jalloh RN  Activity Management: activity encouraged  Range of Motion: active ROM (range of motion) encouraged  Intervention: Prevent Infection  Recent Flowsheet Documentation  Taken 11/22/2023 0600 by Elizabeth Jalloh RN  Infection Prevention:   environmental surveillance performed   hand hygiene promoted   personal protective equipment utilized   single patient room provided  Taken 11/22/2023 0400 by Elizabeth Jalloh RN  Infection Prevention:   environmental surveillance performed   hand hygiene promoted   personal protective equipment utilized   single patient room provided  Taken 11/22/2023 0200 by Elizabeth Jalloh RN  Infection Prevention:   environmental surveillance performed   hand hygiene promoted   personal protective equipment utilized   single patient room provided  Taken 11/22/2023 0000 by Elizabeth Jalloh RN  Infection Prevention:   environmental surveillance performed   hand hygiene promoted   personal protective equipment utilized   single patient room provided  Taken 11/21/2023 2200 by Elizabeth Jalloh RN  Infection Prevention:   environmental surveillance performed   hand hygiene promoted   personal protective equipment utilized   single patient room provided  Taken 11/21/2023 2000 by Elizabeth Jalloh RN  Infection Prevention:   environmental surveillance performed   hand hygiene promoted   personal protective equipment utilized   single patient room provided  Goal: Optimal Comfort and Wellbeing  Outcome: Ongoing, Progressing  Intervention: Provide Person-Centered Care  Recent Flowsheet Documentation  Taken 11/21/2023 2000 by Elizabeth Jalloh  RN  Trust Relationship/Rapport:   care explained   choices provided   emotional support provided   empathic listening provided  Goal: Readiness for Transition of Care  Outcome: Ongoing, Progressing   Goal Outcome Evaluation:   Pt was able to have a BM, stool was loose, watery, green, and foul smelling. Pt was able to sleep most of the night.

## 2023-11-22 NOTE — CASE MANAGEMENT/SOCIAL WORK
Continued Stay Note  [facility]     Patient Name: Ana Thompson  MRN: [id number]  Today's Date: 11/22/2023    Admit Date: 11/20/2023    Plan: home   Discharge Plan       Row Name 11/22/23 1135       Plan    Plan home    Patient/Family in Agreement with Plan yes    Plan Comments Met with Ms. Thompson at the bedside to discuss discharge plan. Her plan is home. She stated she is familiar with [employer] for health insurance assistance and will contact them when she is discharged. No other discharge needs were identified.  will continue to follow plan of care and assist with discharge placement needs as indicated.    Final Discharge Disposition Code 01 - home or self-care                   Discharge Codes    No documentation.                 Expected Discharge Date and Time       Expected Discharge Date Expected Discharge Time    Nov 22, 2023               Brisa Marquez RN

## 2023-11-22 NOTE — PROGRESS NOTES
"GI Daily Progress Note  Subjective:    Chief Complaint:  Follow up terminal ileitis     Complains of a rough night with 7-8 bowel movements since midnight.  Nonbloody stools.       Objective:    /70 (BP Location: Left arm, Patient Position: Lying)   Pulse 93   Temp 97.7 °F (36.5 °C) (Oral)   Resp 20   Ht 172.7 cm (68\")   Wt 59 kg (130 lb)   SpO2 96%   BMI 19.77 kg/m²     Physical Exam  Constitutional:       General: She is not in acute distress.  Cardiovascular:      Rate and Rhythm: Normal rate and regular rhythm.   Pulmonary:      Effort: Pulmonary effort is normal. No respiratory distress.   Abdominal:      General: Bowel sounds are normal. There is no distension.      Palpations: Abdomen is soft.      Tenderness: There is no abdominal tenderness.   Skin:     General: Skin is warm and dry.   Neurological:      Mental Status: She is alert and oriented to person, place, and time.       Lab  Lab Results   Component Value Date    WBC 6.81 11/21/2023    HGB 11.4 (L) 11/22/2023    HGB 10.9 (L) 11/22/2023    HGB 12.1 11/21/2023    MCV 96.0 11/21/2023     11/21/2023    INR 0.91 09/21/2015     Lab Results   Component Value Date    GLUCOSE 136 (H) 11/21/2023    BUN 6 11/21/2023    CREATININE 0.59 11/21/2023    EGFRIFNONA 97 04/28/2021    BCR 10.2 11/21/2023     11/21/2023    K 4.5 11/22/2023    CO2 26.0 11/21/2023    CALCIUM 8.7 11/21/2023    ALBUMIN 3.5 11/21/2023    ALKPHOS 53 11/21/2023    BILITOT 0.3 11/21/2023    ALT 9 11/21/2023    AST 13 11/21/2023     GI Panel PCR:  Campylobacter Not Detected   Plesiomonas shigelloides Not Detected   Salmonella Not Detected   Vibrio Not Detected   Vibrio cholerae Not Detected   Yersinia enterocolitica Not Detected   Enteroaggregative E. coli (EAEC) Not Detected   Enteropathogenic E. coli (EPEC) Not Detected   Enterotoxigenic E. coli (ETEC) lt/st Not Detected   Shiga-like toxin-producing E. coli (STEC) stx1/stx2 Not Detected   Shigella/Enteroinvasive E. " coli (EIEC) Not Detected   Cryptosporidium Not Detected   Cyclospora cayetanensis Not Detected   Entamoeba histolytica Not Detected   Giardia lamblia Not Detected   Adenovirus F40/41 Not Detected   Astrovirus Not Detected   Norovirus GI/GII Not Detected   Rotavirus A Not Detected   Sapovirus (I, II, IV or V) Not Detected     Toxigenic C. difficile by PCR  Not Detected Not Detected      Latest Reference Range & Units 11/22/23 07:52   C-Reactive Protein 0.00 - 0.50 mg/dL <0.30     Assessment:    Crohn's colitis, history of remote ileocolic resection in 2015.  Remote use of Remicade in 2008 with adverse reaction of temporary right sided paralysis   History of tobacco use, currently vapes nicotine containing substance   Normocytic anemia     Plan:    C. Difficile toxin PCR and GI panel are negative for infectious source.  Fecal calprotectin is pending.       >> Begin 40 mg prednisone daily.   Plan to taper by 10 mg weekly.    Needs outpatient colonoscopy  >> Check Vitamin B12 level.        FAWN Love  11/22/23  10:48 EST

## 2023-11-22 NOTE — PROGRESS NOTES
"                  Clinical Nutrition   Nutrition Support Assessment  Reason for Visit: Identified at risk by screening criteria, MST score 2+, Unintentional weight loss      Patient Name: Ana Thompson  YOB: 1975  MRN: [id number]  Date of Encounter: 11/22/23 13:58 EST  Admission date: 11/20/2023        Nutrition Assessment   Admission Diagnosis:  Crohn's colitis [K50.10]      Problem List:    Crohn's colitis    Vapes nicotine containing substance    Hypomagnesemia      PMH:   She  has a past medical history of Crohn's disease.    PSH:  She  has a past surgical history that includes Colon surgery and Tubal ligation.    Applicable Nutrition Concerns:   Skin:  Oral:  GI: h/o ileocolic resection    Applicable Interval History:       Reported/Observed/Food/Nutrition Related History:   Patient on the phone when RD entered room. RD requested a few minutes to complete nutrition assessment. Patient agreed with agitation. Denies any recent changes in appetite/PO intake. Denies recent unintentional weight loss. Reports UBW ~130 lbs. Obtained bed scale weight: 122 lbs 5 ounces. Denies food allergies. Declines ONS. Reports she isn't eating well here because the food \"tastes likes shit\" and that she \"eats better at home\". Did report unintentional weight loss that has occurred since Crohn's diagnosis, but did not share amount. When asked how long patient has had Crohn's patient did not response. RD asked if it was 10 years as RD had read this in the EMR. Pt rudely asked RD why RD is asking if RD read it in the chart. RD informed pt that RD is leaving the room d/t patient being in a bad mood. As RD exited room pt called RD an expletive.      Anthropometrics     Flowsheet Rows      Flowsheet Row First Filed Value   Admission Height 172.7 cm (68\") Documented at 11/20/2023 1439   Admission Weight 59 kg (130 lb) Documented at 11/20/2023 1439       Height: Height: 172.7 cm (68\")  Last Filed Weight: Weight: 59 kg (130 " lb) (11/20/23 1439)  Method: Weight Method: Stated  BMI: BMI (Calculated): 19.8  BMI classification: Normal: 18.5-24.9kg/m2  IBW:  140 lbs    UBW: 130 lbs per patient  Per EMR wt hx: 130 lbs (2/22/23 office visit)    Weight change: pt denies recent unintentional weight loss    Nutrition Focused Physical Exam     Date:       Unable to perform exam due to: Patient/family declined    Current Nutrition Prescription   PO: Diet: Gastrointestinal Diets; Fiber-Restricted; Texture: Regular Texture (IDDSI 7); Fluid Consistency: Thin (IDDSI 0)  Oral Nutrition Supplement: N/A  Intake: Insufficient data      Nutrition Diagnosis     Date:              Updated:    Problem No nutrition diagnosis at this time   Etiology    Signs/Symptoms    Status:     Goal:   General: Nutrition to support treatment  PO: N/A  EN/PN: N/A    Nutrition Intervention      Follow treatment progress, Care plan reviewed, Supplement offered/refused    Will continue to monitor and offer nutrition interventions as appropriate.      Monitoring/Evaluation:   Per protocol, I&O, PO intake, Pertinent labs, Weight, GI status, Symptoms, POC/GOC      Bernice Loyd RD  Time Spent: 20 min   No Vaccines Administered.

## 2023-11-22 NOTE — DISCHARGE SUMMARY
[facility] Medicine Services  DISCHARGE SUMMARY    Patient Name: Ana Thompson  : 1975  MRN: [id number]    Date of Admission: 2023  4:06 PM  Date of Discharge:  2023  Primary Care Physician: Liliya Hill MD    Consults       Date and Time Order Name Status Description    2023  1:14 PM Inpatient Gastroenterology Consult Completed     2023 12:22 AM Inpatient Colorectal Surgery Consult Completed             Hospital Course     Presenting Problem: Crohn's colitis    Active Hospital Problems    Diagnosis  POA    **Crohn's colitis [K50.10]  Yes    Vapes nicotine containing substance [Z72.0]  Yes    Hypomagnesemia [E83.42]  Yes      Resolved Hospital Problems    Diagnosis Date Resolved POA    Hypokalemia [E87.6] 2023 Yes          Hospital Course:  Ana Thompson is a 48 y.o. female with a past medical history significant for Crohn's disease, was admitted for bright red blood per rectum.  Colorectal surgery and gastroenterology were consulted.  Infectious etiologies were ruled out and patient was subsequently started on steroids with improvement.  Patient's hemoglobin remained stable and she was determined to be stable for discharge with outpatient GI follow-up and colonoscopy at that time.     Crohn's colitis  -Prednisone taper,40 mg decrease 10 mg each week  -Follow up [facility] for outpatient endoscopy     Hypokalemia  -Resolved     Nicotine dependence    Discharge Follow Up Recommendations for outpatient labs/diagnostics:  Follow-up with PCP in 1 week  Follow-up with GI in 2 weeks      Day of Discharge     HPI:   Patient endorses continued abdominal discomfort but improved compared to admission.  Endorses being able to tolerate a diet.  Denies any blood in bowel movements.  States she is ready for discharge.    Vital Signs:   Temp:  [97.5 °F (36.4 °C)-98.2 °F (36.8 °C)] 97.7 °F (36.5 °C)  Heart Rate:  [61-93] 93  Resp:  [18-20] 20  BP:  ()/(47-74) 117/70      Physical Exam:  General appearance: alert, awake, oriented, no acute distress   Cardiovascular: RRR, no murmurs or rubs, radial pulse full 2/4 BL   Respiratory: CTAB, no crackles or wheezes   Abdomen: soft, mild generalized tenderness to palpation, no organomegaly, bowel sounds normoactive    Neuro/CNS: alert and oriented x3, normal speech    Pertinent  and/or Most Recent Results     LAB RESULTS:      Lab 11/22/23  0752 11/22/23  0012 11/21/23  1731 11/21/23  0742 11/20/23  1502   WBC  --   --   --  6.81 8.76   HEMOGLOBIN 11.4* 10.9* 12.1 13.0  13.0 14.4   HEMATOCRIT 34.2 31.8* 35.9 38.8  38.8 42.1   PLATELETS  --   --   --  336 384   NEUTROS ABS  --   --   --  5.72 5.36   IMMATURE GRANS (ABS)  --   --   --  0.02 0.03   LYMPHS ABS  --   --   --  1.03 2.51   MONOS ABS  --   --   --  0.03* 0.71   EOS ABS  --   --   --  0.00 0.11   MCV  --   --   --  96.0 94.2   CRP <0.30  --   --   --   --          Lab 11/22/23  0752 11/21/23  1731 11/21/23  0935 11/21/23  0742 11/20/23  1502   SODIUM  --   --  142 143 141   POTASSIUM 4.5 3.4* 3.2* 3.9 2.6*   CHLORIDE  --   --  103 107 100   CO2  --   --  26.0 25.0 33.0*   ANION GAP  --   --  13.0 11.0 8.0   BUN  --   --  6 6 6   CREATININE  --   --  0.59 0.51* 0.80   EGFR  --   --  111.3 115.3 91.0   GLUCOSE  --   --  136* 141* 86   CALCIUM  --   --  8.7 8.4* 8.8   MAGNESIUM  --   --   --  1.6 1.2*         Lab 11/21/23  0742 11/20/23  1502   TOTAL PROTEIN 5.9* 7.0   ALBUMIN 3.5 4.2   GLOBULIN 2.4 2.8   ALT (SGPT) 9 11   AST (SGOT) 13 13   BILIRUBIN 0.3 0.4   ALK PHOS 53 63   LIPASE  --  101*                     Brief Urine Lab Results  (Last result in the past 365 days)        Color   Clarity   Blood   Leuk Est   Nitrite   Protein   CREAT   Urine HCG        11/20/23 1511               Negative             Microbiology Results (last 10 days)       Procedure Component Value - Date/Time    Gastrointestinal Panel, PCR - Stool, Per Rectum [[id number]]   (Normal) Collected: 11/22/23 0122    Lab Status: Final result Specimen: Stool from Per Rectum Updated: 11/22/23 0903     Campylobacter Not Detected     Plesiomonas shigelloides Not Detected     Salmonella Not Detected     Vibrio Not Detected     Vibrio cholerae Not Detected     Yersinia enterocolitica Not Detected     Enteroaggregative E. coli (EAEC) Not Detected     Enteropathogenic E. coli (EPEC) Not Detected     Enterotoxigenic E. coli (ETEC) lt/st Not Detected     Shiga-like toxin-producing E. coli (STEC) stx1/stx2 Not Detected     Shigella/Enteroinvasive E. coli (EIEC) Not Detected     Cryptosporidium Not Detected     Cyclospora cayetanensis Not Detected     Entamoeba histolytica Not Detected     Giardia lamblia Not Detected     Adenovirus F40/41 Not Detected     Astrovirus Not Detected     Norovirus GI/GII Not Detected     Rotavirus A Not Detected     Sapovirus (I, II, IV or V) Not Detected    Clostridioides difficile Toxin - Stool, Per Rectum [[id number]]  (Normal) Collected: 11/22/23 0122    Lab Status: Final result Specimen: Stool from Per Rectum Updated: 11/22/23 0834    Narrative:      The following orders were created for panel order Clostridioides difficile Toxin - Stool, Per Rectum.  Procedure                               Abnormality         Status                     ---------                               -----------         ------                     Clostridioides difficile...[[id number]]  Normal              Final result                 Please view results for these tests on the individual orders.    Clostridioides difficile Toxin, PCR - Stool, Per Rectum [[id number]]  (Normal) Collected: 11/22/23 0122    Lab Status: Final result Specimen: Stool from Per Rectum Updated: 11/22/23 0834     Toxigenic C. difficile by PCR Not Detected    Narrative:      The result indicates the absence of toxigenic C. difficile from stool specimen.             CT Abdomen Pelvis With Contrast    Result Date:  11/20/2023  CT ABDOMEN PELVIS W CONTRAST Date of Exam: 11/20/2023 4:55 PM EST Indication: h/o crohns brbpr. Comparison: None available. Technique: Axial CT images were obtained of the abdomen and pelvis following the uneventful intravenous administration of Isovue-300 85 mL IV. Reconstructed coronal and sagittal images were also obtained. Automated exposure control and iterative construction methods were used. Findings: Lung Bases: No focal consolidation or effusion. There is minimal bronchiectasis and tree-in-bud opacities in the inferior lingula. Liver: Liver is normal in size and CT density. No focal lesions. The portal vein is patent. Biliary/Gallbladder:  The gallbladder is normal without evidence of radiopaque stones. The biliary tree is nondilated. Pancreas:  Pancreas is normal. There is no evidence of pancreatic mass or peripancreatic fluid. Spleen: Spleen is normal in size and CT density. Gastrointestinal/Mesentery: The stomach and duodenum appear within normal limits. There is no evidence of dilated small bowel loops. There is moderate wall thickening of the terminal ileum. There is mild hyperemia of the associated mesenteric. The appendix is surgically absent. There is mild dilatation of the cecum, ascending, and transverse colon. There is loss of haustral markings throughout the colon. There is mild diffuse colonic wall thickening.  No free air.No mesenteric fluid collections identified. Fat-containing umbilical hernia measures 4.8 cm in width. Adrenals: The right adrenal gland is unremarkable. Stable 3.3 cm smoothly marginated left adrenal nodule previously described as adenoma. Kidneys: Kidneys are normal in size. There are no stones or hydronephrosis. The kidneys demonstrate symmetric enhancement and contrast excretion. Bladder: The urinary bladder is normal. Reproductive organs:  The uterus and ovaries appear within normal limits. Slightly dilated periuterine veins are visualized as well as  dilatation of the left gonadal vein. There is early opacification suggestive of reflux. Findings may be on the basis of pelvic congestion syndrome in the proper setting. Retroperitoneal/Lymph Nodes/Vasculature: No retroperitoneal adenopathy is identified. The aorta is normal in caliber.   Bony Structures:  No acute fracture or aggressive lesions.     Impression: Findings compatible with terminal ileitis. Mild diffuse colonic wall thickening and loss of haustral markings. This can be seen with ulcerative colitis. There is mild dilatation of the right colon and transverse colon without discrete transition point. This may be secondary to colonic ileus. Stable 3.3 cm left adrenal adenoma. Moderate-sized fat-containing umbilical hernia measuring 4.8 cm in width. Electronically Signed: Ankit Fournier MD  11/20/2023 5:21 PM EST  Workstation ID: [id number]                 Plan for Follow-up of Pending Labs/Results:   Pending Labs       Order Current Status    Calprotectin, Fecal - Stool, Per Rectum In process    Histoplasma Ag Ur - Urine, Urine, Clean Catch In process    QuantiFERON-TB Gold Plus In process          Discharge Details        Discharge Medications        New Medications        Instructions Start Date   lidocaine 4 % cream  Commonly known as: LMX   1 application , Topical, Every 4 Hours PRN      ondansetron 4 MG tablet  Commonly known as: ZOFRAN   4 mg, Oral, Every 6 Hours PRN      predniSONE 10 MG tablet  Commonly known as: DELTASONE   Take 4 tablets by mouth Daily for 7 days, THEN 3 tablets Daily for 7 days, THEN 2 tablets Daily for 7 days, THEN 1 tablet Daily for 7 days.   Start Date: November 22, 2023            Continue These Medications        Instructions Start Date   levocetirizine 5 MG tablet  Commonly known as: XYZAL   5 mg, Oral, Every Evening               Allergies   Allergen Reactions    Hydrocodone-Acetaminophen Itching    Codeine Other (See Comments)     UNKNOWN  Other reaction(s): Other (See  "Comments)  UNKNOWN    Iodine Other (See Comments)     BURNS    Metoclopramide Anxiety     Other reaction(s): \"it makes me looney\"         Discharge Disposition:  Home or Self Care    Diet:  Hospital:  Diet Order   Procedures    Diet: Gastrointestinal Diets; Fiber-Restricted; Texture: Regular Texture (IDDSI 7); Fluid Consistency: Thin (IDDSI 0)       Diet Instructions       Advance Diet As Tolerated -Target Diet: Regular      Target Diet: Regular             Activity:  Activity Instructions       Activity as Tolerated      Work Restrictions      Type of Restriction: Work    May Return to Work: Specific Date    Return To Work Date: 11/27/2023    With / Without Restrictions: Without Restrictions            Restrictions or Other Recommendations:         CODE STATUS:    Code Status and Medical Interventions:   Ordered at: 11/20/23 2021     Level Of Support Discussed With:    Patient     Code Status (Patient has no pulse and is not breathing):    CPR (Attempt to Resuscitate)     Medical Interventions (Patient has pulse or is breathing):    Full Support       Future Appointments   Date Time Provider Department Center   11/29/2023  1:30 PM Liliya Hill MD E  [facility]       Additional Instructions for the Follow-ups that You Need to Schedule       Discharge Follow-up with PCP   As directed       Currently Documented PCP:    Liliya Hill MD    PCP Phone Number:    [phone]     Follow Up Details: 1 week        Discharge Follow-up with Specialty: [facility]; 2 Weeks   As directed      Specialty: [facility]   Follow Up: 2 Weeks                      Chapin Kaba DO  11/22/23      Time Spent on Discharge:  I spent  33  minutes on this discharge activity which included: face-to-face encounter with the patient, reviewing the data in the system, coordination of the care with the nursing staff as well as consultants, documentation, and entering orders.         "

## 2023-11-23 NOTE — OUTREACH NOTE
Prep Survey      Flowsheet Row Responses   [facility] patient discharged from? [place]   Is LACE score < 7 ? Yes   Eligibility [facility]   Date of Admission 11/20/23   Date of Discharge 11/22/23   Discharge Disposition Home or Self Care   Discharge diagnosis Crohn's colitis   Does the patient have one of the following disease processes/diagnoses(primary or secondary)? Other   Does the patient have Home health ordered? No   Is there a DME ordered? No   Prep survey completed? Yes            LAMINE MARTE - Registered Nurse

## 2023-11-24 LAB
GAMMA INTERFERON BACKGROUND BLD IA-ACNC: 0.03 IU/ML
M TB IFN-G BLD-IMP: NEGATIVE
M TB IFN-G CD4+ T-CELLS BLD-ACNC: 0.03 IU/ML
M TBIFN-G CD4+ CD8+T-CELLS BLD-ACNC: 0.04 IU/ML
MITOGEN IGNF BLD-ACNC: >10 IU/ML
QUANTIFERON INCUBATION: NORMAL
SERVICE CMNT-IMP: NORMAL

## 2023-11-26 LAB — H CAPSUL AG UR QL IA: <0.5

## 2023-11-27 ENCOUNTER — TRANSITIONAL CARE MANAGEMENT TELEPHONE ENCOUNTER (OUTPATIENT)
Dept: CALL CENTER | Facility: HOSPITAL | Age: 48
End: 2023-11-27

## 2023-11-27 NOTE — OUTREACH NOTE
Call Center TCM Note      Flowsheet Row Responses   [facility] patient discharged from? [place]   Does the patient have one of the following disease processes/diagnoses(primary or secondary)? Other   TCM attempt successful? No   Unsuccessful attempts Attempt 1  [No PCP verbal release in place.]   Call Status Left message            Rachelle Fisher RN    11/27/2023, 16:13 EST

## 2023-11-27 NOTE — OUTREACH NOTE
Call Center TCM Note      Flowsheet Row Responses   [facility] patient discharged from? [place]   Does the patient have one of the following disease processes/diagnoses(primary or secondary)? Other   TCM attempt successful? No   Unsuccessful attempts Attempt 2            Rachelle Fisher RN    11/27/2023, 16:34 EST

## 2023-11-28 ENCOUNTER — TRANSITIONAL CARE MANAGEMENT TELEPHONE ENCOUNTER (OUTPATIENT)
Dept: CALL CENTER | Facility: HOSPITAL | Age: 48
End: 2023-11-28

## 2023-11-28 NOTE — OUTREACH NOTE
Call Center TCM Note      Flowsheet Row Responses   [facility] patient discharged from? [place]   Does the patient have one of the following disease processes/diagnoses(primary or secondary)? Other   TCM attempt successful? Yes   Call start time 1407   Call end time 1408   Discharge diagnosis Crohn's colitis   Meds reviewed with patient/caregiver? Yes   Is the patient having any side effects they believe may be caused by any medication additions or changes? No   Does the patient have all medications ordered at discharge? Yes   Is the patient taking all medications as directed (includes completed medication regime)? Yes   Comments Hospital f/u with PCP tomorrow (11/29)   Does the patient have an appointment with their PCP within 7-14 days of discharge? Yes   Has home health visited the patient within 72 hours of discharge? N/A   Psychosocial issues? No   Did the patient receive a copy of their discharge instructions? Yes   Nursing interventions Reviewed instructions with patient   What is the patient's perception of their health status since discharge? Improving   Is the patient/caregiver able to teach back signs and symptoms related to disease process for when to call PCP? Yes   Is the patient/caregiver able to teach back signs and symptoms related to disease process for when to call 911? Yes   Is the patient/caregiver able to teach back the hierarchy of who to call/visit for symptoms/problems? PCP, Specialist, Home health nurse, Urgent Care, ED, 911 Yes   TCM call completed? Yes   Wrap up additional comments Doing well, no questions, confirmed hospital f/u appt with PCP for tomorrow (11/29).   Call end time 1408   Would this patient benefit from a Referral to Amb Social Work? No   Is the patient interested in additional calls from an ambulatory ? No            Maeve Escalona RN    11/28/2023, 14:11 EST

## 2023-11-29 LAB — CALPROTECTIN STL-MCNT: 237 UG/G (ref 0–120)

## 2024-05-04 ENCOUNTER — APPOINTMENT (OUTPATIENT)
Dept: CT IMAGING | Facility: HOSPITAL | Age: 49
End: 2024-05-04
Payer: MEDICAID

## 2024-05-04 ENCOUNTER — HOSPITAL ENCOUNTER (EMERGENCY)
Facility: HOSPITAL | Age: 49
Discharge: HOME | End: 2024-05-05
Attending: EMERGENCY MEDICINE
Payer: MEDICAID

## 2024-05-04 ENCOUNTER — APPOINTMENT (OUTPATIENT)
Dept: GENERAL RADIOLOGY | Facility: HOSPITAL | Age: 49
End: 2024-05-04
Payer: MEDICAID

## 2024-05-04 VITALS
DIASTOLIC BLOOD PRESSURE: 59 MMHG | OXYGEN SATURATION: 97 % | TEMPERATURE: 98.2 F | BODY MASS INDEX: 18.04 KG/M2 | HEIGHT: 68 IN | WEIGHT: 119 LBS | RESPIRATION RATE: 16 BRPM | SYSTOLIC BLOOD PRESSURE: 91 MMHG | HEART RATE: 61 BPM

## 2024-05-04 DIAGNOSIS — E87.6 HYPOKALEMIA: ICD-10-CM

## 2024-05-04 DIAGNOSIS — J18.9 PNEUMONIA DUE TO INFECTIOUS ORGANISM, UNSPECIFIED LATERALITY, UNSPECIFIED PART OF LUNG: Primary | ICD-10-CM

## 2024-05-04 DIAGNOSIS — R04.2 HEMOPTYSIS: ICD-10-CM

## 2024-05-04 LAB
ALBUMIN SERPL-MCNC: 3.8 G/DL (ref 3.5–5.2)
ALBUMIN/GLOB SERPL: 1.6 G/DL
ALP SERPL-CCNC: 57 U/L (ref 39–117)
ALT SERPL W P-5'-P-CCNC: 10 U/L (ref 1–33)
ANION GAP SERPL CALCULATED.3IONS-SCNC: 10 MMOL/L (ref 5–15)
AST SERPL-CCNC: 13 U/L (ref 1–32)
BASOPHILS # BLD AUTO: 0.04 10*3/MM3 (ref 0–0.2)
BASOPHILS NFR BLD AUTO: 0.5 % (ref 0–1.5)
BILIRUB SERPL-MCNC: 0.3 MG/DL (ref 0–1.2)
BUN SERPL-MCNC: 8 MG/DL (ref 6–20)
BUN/CREAT SERPL: 11.1 (ref 7–25)
CALCIUM SPEC-SCNC: 9.1 MG/DL (ref 8.6–10.5)
CHLORIDE SERPL-SCNC: 101 MMOL/L (ref 98–107)
CO2 SERPL-SCNC: 28 MMOL/L (ref 22–29)
CREAT SERPL-MCNC: 0.72 MG/DL (ref 0.57–1)
DEPRECATED RDW RBC AUTO: 42.7 FL (ref 37–54)
EGFRCR SERPLBLD CKD-EPI 2021: 103.3 ML/MIN/1.73
EOSINOPHIL # BLD AUTO: 0.19 10*3/MM3 (ref 0–0.4)
EOSINOPHIL NFR BLD AUTO: 2.2 % (ref 0.3–6.2)
ERYTHROCYTE [DISTWIDTH] IN BLOOD BY AUTOMATED COUNT: 12.2 % (ref 12.3–15.4)
FLUAV RNA RESP QL NAA+PROBE: NOT DETECTED
FLUBV RNA RESP QL NAA+PROBE: NOT DETECTED
GLOBULIN UR ELPH-MCNC: 2.4 GM/DL
GLUCOSE SERPL-MCNC: 77 MG/DL (ref 65–99)
HCT VFR BLD AUTO: 37.2 % (ref 34–46.6)
HGB BLD-MCNC: 12.4 G/DL (ref 12–15.9)
IMM GRANULOCYTES # BLD AUTO: 0.02 10*3/MM3 (ref 0–0.05)
IMM GRANULOCYTES NFR BLD AUTO: 0.2 % (ref 0–0.5)
LYMPHOCYTES # BLD AUTO: 2.74 10*3/MM3 (ref 0.7–3.1)
LYMPHOCYTES NFR BLD AUTO: 32.3 % (ref 19.6–45.3)
MCH RBC QN AUTO: 31.6 PG (ref 26.6–33)
MCHC RBC AUTO-ENTMCNC: 33.3 G/DL (ref 31.5–35.7)
MCV RBC AUTO: 94.7 FL (ref 79–97)
MONOCYTES # BLD AUTO: 0.71 10*3/MM3 (ref 0.1–0.9)
MONOCYTES NFR BLD AUTO: 8.4 % (ref 5–12)
NEUTROPHILS NFR BLD AUTO: 4.79 10*3/MM3 (ref 1.7–7)
NEUTROPHILS NFR BLD AUTO: 56.4 % (ref 42.7–76)
NRBC BLD AUTO-RTO: 0 /100 WBC (ref 0–0.2)
PLATELET # BLD AUTO: 348 10*3/MM3 (ref 140–450)
PMV BLD AUTO: 10.8 FL (ref 6–12)
POTASSIUM SERPL-SCNC: 3.2 MMOL/L (ref 3.5–5.2)
PROT SERPL-MCNC: 6.2 G/DL (ref 6–8.5)
RBC # BLD AUTO: 3.93 10*6/MM3 (ref 3.77–5.28)
SARS-COV-2 RNA RESP QL NAA+PROBE: NOT DETECTED
SODIUM SERPL-SCNC: 139 MMOL/L (ref 136–145)
WBC NRBC COR # BLD AUTO: 8.49 10*3/MM3 (ref 3.4–10.8)

## 2024-05-04 PROCEDURE — 99285 EMERGENCY DEPT VISIT HI MDM: CPT

## 2024-05-04 PROCEDURE — 25810000003 SODIUM CHLORIDE 0.9 % SOLUTION 250 ML FLEX CONT: Performed by: NURSE PRACTITIONER

## 2024-05-04 PROCEDURE — 25510000001 IOPAMIDOL PER 1 ML: Performed by: EMERGENCY MEDICINE

## 2024-05-04 PROCEDURE — 80053 COMPREHEN METABOLIC PANEL: CPT | Performed by: NURSE PRACTITIONER

## 2024-05-04 PROCEDURE — 25010000002 ONDANSETRON PER 1 MG: Performed by: NURSE PRACTITIONER

## 2024-05-04 PROCEDURE — 25810000003 SODIUM CHLORIDE 0.9 % SOLUTION: Performed by: NURSE PRACTITIONER

## 2024-05-04 PROCEDURE — 25010000002 CEFTRIAXONE PER 250 MG: Performed by: NURSE PRACTITIONER

## 2024-05-04 PROCEDURE — 25010000002 AZITHROMYCIN PER 500 MG: Performed by: NURSE PRACTITIONER

## 2024-05-04 PROCEDURE — 96365 THER/PROPH/DIAG IV INF INIT: CPT

## 2024-05-04 PROCEDURE — 96367 TX/PROPH/DG ADDL SEQ IV INF: CPT

## 2024-05-04 PROCEDURE — 85025 COMPLETE CBC W/AUTO DIFF WBC: CPT | Performed by: NURSE PRACTITIONER

## 2024-05-04 PROCEDURE — 96375 TX/PRO/DX INJ NEW DRUG ADDON: CPT

## 2024-05-04 PROCEDURE — 71045 X-RAY EXAM CHEST 1 VIEW: CPT

## 2024-05-04 PROCEDURE — 71275 CT ANGIOGRAPHY CHEST: CPT

## 2024-05-04 PROCEDURE — 87636 SARSCOV2 & INF A&B AMP PRB: CPT | Performed by: NURSE PRACTITIONER

## 2024-05-04 RX ORDER — SODIUM CHLORIDE 0.9 % (FLUSH) 0.9 %
10 SYRINGE (ML) INJECTION AS NEEDED
Status: DISCONTINUED | OUTPATIENT
Start: 2024-05-04 | End: 2024-05-05 | Stop reason: HOSPADM

## 2024-05-04 RX ORDER — AMOXICILLIN AND CLAVULANATE POTASSIUM 875; 125 MG/1; MG/1
1 TABLET, FILM COATED ORAL 2 TIMES DAILY
Qty: 20 TABLET | Refills: 0 | Status: SHIPPED | OUTPATIENT
Start: 2024-05-04

## 2024-05-04 RX ORDER — ALBUTEROL SULFATE 90 UG/1
2 AEROSOL, METERED RESPIRATORY (INHALATION) EVERY 4 HOURS PRN
Qty: 18 G | Refills: 0 | Status: SHIPPED | OUTPATIENT
Start: 2024-05-04

## 2024-05-04 RX ORDER — ONDANSETRON 2 MG/ML
4 INJECTION INTRAMUSCULAR; INTRAVENOUS ONCE
Status: COMPLETED | OUTPATIENT
Start: 2024-05-04 | End: 2024-05-04

## 2024-05-04 RX ORDER — POTASSIUM CHLORIDE 750 MG/1
20 CAPSULE, EXTENDED RELEASE ORAL ONCE
Status: COMPLETED | OUTPATIENT
Start: 2024-05-04 | End: 2024-05-04

## 2024-05-04 RX ORDER — ONDANSETRON 4 MG/1
4 TABLET, ORALLY DISINTEGRATING ORAL EVERY 6 HOURS PRN
Qty: 12 TABLET | Refills: 0 | Status: SHIPPED | OUTPATIENT
Start: 2024-05-04

## 2024-05-04 RX ORDER — AZITHROMYCIN 250 MG/1
TABLET, FILM COATED ORAL
Qty: 4 TABLET | Refills: 0 | Status: SHIPPED | OUTPATIENT
Start: 2024-05-04

## 2024-05-04 RX ADMIN — ONDANSETRON 4 MG: 2 INJECTION INTRAMUSCULAR; INTRAVENOUS at 22:41

## 2024-05-04 RX ADMIN — SODIUM CHLORIDE 1000 ML: 9 INJECTION, SOLUTION INTRAVENOUS at 22:42

## 2024-05-04 RX ADMIN — CEFTRIAXONE 2000 MG: 2 INJECTION, POWDER, FOR SOLUTION INTRAMUSCULAR; INTRAVENOUS at 21:07

## 2024-05-04 RX ADMIN — AZITHROMYCIN DIHYDRATE 500 MG: 500 INJECTION, POWDER, LYOPHILIZED, FOR SOLUTION INTRAVENOUS at 21:57

## 2024-05-04 RX ADMIN — POTASSIUM CHLORIDE 20 MEQ: 750 CAPSULE, EXTENDED RELEASE ORAL at 20:10

## 2024-05-04 RX ADMIN — IOPAMIDOL 85 ML: 755 INJECTION, SOLUTION INTRAVENOUS at 20:32

## 2024-05-04 NOTE — Clinical Note
[facility] EMERGENCY DEPARTMENT  [address]  [facility] [address]  Phone: [phone]    Ana Thompson was seen and treated in our emergency department on 5/4/2024.  She may return to work on 05/06/2024.         Thank you for choosing [facility].    Marco Antonio Kaba APRN

## 2024-05-05 NOTE — ED PROVIDER NOTES
"Subjective   History of Present Illness  Patient presents the ER with hemoptysis.  She tells me she woke up and she had some bright red blood in her sputum.  She denies any chest pain.  She tells me she has a slight cough.  She has not been a smoker many years.  She denies any fevers or chills.  She tells me she is not short of breath she has not had any bloody cough since this morning.  She tells me she no longer smokes or vapes.  Otherwise typically healthy.  Works as a  for [employer].        Review of Systems    Past Medical History:   Diagnosis Date    Crohn's disease        Allergies   Allergen Reactions    Hydrocodone-Acetaminophen Itching    Codeine Other (See Comments)     UNKNOWN  Other reaction(s): Other (See Comments)  UNKNOWN    Iodine Other (See Comments)     BURNS    Metoclopramide Anxiety     Other reaction(s): \"it makes me looney\"       Past Surgical History:   Procedure Laterality Date    COLON SURGERY      TUBAL ABDOMINAL LIGATION         Family History   Problem Relation Age of Onset    Kidney disease Mother        Social History     Socioeconomic History    Marital status: Single   Tobacco Use    Smoking status: Former     Current packs/day: 0.50     Types: Cigarettes    Smokeless tobacco: Never   Vaping Use    Vaping status: Every Day    Substances: Nicotine, Flavoring    Devices: Refillable tank   Substance and Sexual Activity    Alcohol use: No    Drug use: No    Sexual activity: Defer           Objective   Physical Exam  Constitutional:       Appearance: She is well-developed.   HENT:      Head: Normocephalic and atraumatic.      Right Ear: External ear normal.      Left Ear: External ear normal.      Nose: Nose normal.   Eyes:      Conjunctiva/sclera: Conjunctivae normal.      Pupils: Pupils are equal, round, and reactive to light.   Cardiovascular:      Rate and Rhythm: Normal rate and regular rhythm.      Heart sounds: Normal heart sounds.   Pulmonary:      Effort: Pulmonary " effort is normal.      Breath sounds: Normal breath sounds.   Abdominal:      General: Bowel sounds are normal.      Palpations: Abdomen is soft.   Musculoskeletal:         General: Normal range of motion.      Cervical back: Normal range of motion and neck supple.   Skin:     General: Skin is warm and dry.   Neurological:      Mental Status: She is alert and oriented to person, place, and time.   Psychiatric:         Behavior: Behavior normal.         Thought Content: Thought content normal.         Judgment: Judgment normal.         Procedures           ED Course  ED Course as of 05/04/24 2244   Sat May 04, 2024   2236 Had a good conversation with the patient.  She is not hypoxic or tachycardic.  She did develop some nausea at the end of her Zithromax.  She did not get a rash or any hives.  Her nausea could also be related to the potassium that was previously giving as well.  I still think it is reasonable to continue with a prescription for the Zithromax.  Will also treat her with Augmentin as well for the current guidelines.  I did give the patient strict warning signs and symptoms of worsening condition.  Will also do an albuterol inhaler as well.  Patient well aware the signs of worsening's.  All thankful and agreeable. [JM]      ED Course User Index  [JM] Marco Antonio Kaba APRN                                 CT Angiogram Chest   Final Result   Impression:      1. New tree-in-bud nodules and endobronchial debris in the lingula consistent with infectious bronchiolitis. New mild airspace disease in the left lower lobe compatible with pneumonia.   2. Mild emphysema.            Electronically Signed: Ross Luna MD     5/4/2024 8:38 PM EDT     Workstation ID: [id number]      XR Chest 1 View   Final Result   Impression: No acute cardiopulmonary disease.         Electronically Signed: Domingo Blas MD     5/4/2024 6:44 PM EDT     Workstation ID: [id number]                    Medical Decision Making  Problems  Addressed:  Hemoptysis: complicated acute illness or injury  Hypokalemia: complicated acute illness or injury  Pneumonia due to infectious organism, unspecified laterality, unspecified part of lung: complicated acute illness or injury    Amount and/or Complexity of Data Reviewed  External Data Reviewed: labs and radiology.  Labs: ordered. Decision-making details documented in ED Course.  Radiology: ordered. Decision-making details documented in ED Course.  ECG/medicine tests:  Decision-making details documented in ED Course.    Risk  Prescription drug management.        Final diagnoses:   Pneumonia due to infectious organism, unspecified laterality, unspecified part of lung   Hemoptysis   Hypokalemia       ED Disposition  ED Disposition       ED Disposition   Discharge    Condition   Stable    Comment   --               PATIENT CONNECTION - [facility] [address]  [phone]  Schedule an appointment as soon as possible for a visit       [facility] EMERGENCY DEPARTMENT  [address]  [facility] [id number] [phone]    If symptoms worsen         Medication List        New Prescriptions      albuterol sulfate  (90 Base) MCG/ACT inhaler  Commonly known as: PROVENTIL HFA;VENTOLIN HFA;PROAIR HFA  Inhale 2 puffs Every 4 (Four) Hours As Needed for Shortness of Air or Wheezing.     amoxicillin-clavulanate 875-125 MG per tablet  Commonly known as: AUGMENTIN  Take 1 tablet by mouth 2 (Two) Times a Day.     azithromycin 250 MG tablet  Commonly known as: ZITHROMAX  Starting tomorrow take 1 tablet daily for 4 days.     ondansetron ODT 4 MG disintegrating tablet  Commonly known as: ZOFRAN-ODT  Take 1 tablet by mouth Every 6 (Six) Hours As Needed for Nausea or Vomiting.               Where to Get Your Medications        These medications were sent to [id number] - [place], [place] - [address] AT [facility] [address] - [phone]  -  [phone] FX  [address] [address]      Phone: [phone]   albuterol sulfate  (90 Base) MCG/ACT inhaler  amoxicillin-clavulanate 875-125 MG per tablet  azithromycin 250 MG tablet  ondansetron ODT 4 MG disintegrating tablet            Marco Antonio Kaba APRN  05/04/24 2238       Marco Antonio Kaba APRN  05/04/24 2246